# Patient Record
Sex: MALE | Race: WHITE | Employment: FULL TIME | URBAN - METROPOLITAN AREA
[De-identification: names, ages, dates, MRNs, and addresses within clinical notes are randomized per-mention and may not be internally consistent; named-entity substitution may affect disease eponyms.]

---

## 2017-01-30 DIAGNOSIS — E11.65 TYPE 2 DIABETES MELLITUS WITH HYPERGLYCEMIA, WITHOUT LONG-TERM CURRENT USE OF INSULIN (H): ICD-10-CM

## 2017-01-30 LAB — HBA1C MFR BLD: 8.4 % (ref 4.3–6)

## 2017-01-30 PROCEDURE — 36415 COLL VENOUS BLD VENIPUNCTURE: CPT | Performed by: FAMILY MEDICINE

## 2017-01-30 PROCEDURE — 83036 HEMOGLOBIN GLYCOSYLATED A1C: CPT | Performed by: FAMILY MEDICINE

## 2017-01-30 NOTE — PROGRESS NOTES
Quick Note:    Mat,  Unfortunately, your glycosylated hemoglobin is even higher than last time. I see you are coming in for an appointment on Thursday, and we will discuss at that time strategies to get this down into the acceptable range      Eliel Barragan MD    ______

## 2017-02-02 ENCOUNTER — OFFICE VISIT (OUTPATIENT)
Dept: FAMILY MEDICINE | Facility: CLINIC | Age: 57
End: 2017-02-02
Payer: COMMERCIAL

## 2017-02-02 VITALS
DIASTOLIC BLOOD PRESSURE: 86 MMHG | WEIGHT: 273.7 LBS | HEIGHT: 76 IN | HEART RATE: 59 BPM | SYSTOLIC BLOOD PRESSURE: 136 MMHG | BODY MASS INDEX: 33.33 KG/M2

## 2017-02-02 DIAGNOSIS — I10 ESSENTIAL HYPERTENSION WITH GOAL BLOOD PRESSURE LESS THAN 140/90: ICD-10-CM

## 2017-02-02 DIAGNOSIS — G47.33 OSA (OBSTRUCTIVE SLEEP APNEA): ICD-10-CM

## 2017-02-02 DIAGNOSIS — E11.65 TYPE 2 DIABETES MELLITUS WITH HYPERGLYCEMIA, WITHOUT LONG-TERM CURRENT USE OF INSULIN (H): Primary | ICD-10-CM

## 2017-02-02 DIAGNOSIS — E78.5 HYPERLIPIDEMIA LDL GOAL <100: ICD-10-CM

## 2017-02-02 DIAGNOSIS — E66.09 NON MORBID OBESITY DUE TO EXCESS CALORIES: ICD-10-CM

## 2017-02-02 PROCEDURE — 99214 OFFICE O/P EST MOD 30 MIN: CPT | Performed by: FAMILY MEDICINE

## 2017-02-02 PROCEDURE — 99207 ZZC FOOT EXAM  NO CHARGE: CPT | Performed by: FAMILY MEDICINE

## 2017-02-02 RX ORDER — GLIMEPIRIDE 4 MG/1
4 TABLET ORAL
Qty: 30 TABLET | Refills: 0 | Status: SHIPPED | OUTPATIENT
Start: 2017-02-02 | End: 2017-02-24

## 2017-02-02 RX ORDER — METFORMIN HCL 500 MG
TABLET, EXTENDED RELEASE 24 HR ORAL
Qty: 360 TABLET | Refills: 1 | Status: SHIPPED | OUTPATIENT
Start: 2017-02-02 | End: 2017-06-26

## 2017-02-02 NOTE — MR AVS SNAPSHOT
After Visit Summary   2/2/2017    Mat Palmer    MRN: [id number]           Patient Information     Date Of Birth          1960        Visit Information        Provider Department      2/2/2017 3:00 PM MICHELLE Barragan MD [facility]        Today's Diagnoses     Type 2 diabetes mellitus with hyperglycemia, without long-term current use of insulin (H)    -  1       Care Instructions    Diabetes Plan  1. Hemoglobin A1c goal is between 7% and 8%  Your Hemoglobin A1c is not at goal  Plan is:Add glimepiride 4 mg every am. Do not take if you are unable to eat for any reason  2. LDL (bad cholesterol) goal is less than 100  Your LDL is at goal  Plan is: Continue medications at current doses  3. Blood pressure goal is less than 140/90.  Your blood pressure is at goal.  Plan is: Continue medications at current doses    Come back for glycosylated hemoglobin in 3 months. If Ok will refill for 3 more months and office visit then        Follow-ups after your visit        Who to contact     If you have questions or need follow up information about today's clinic visit or your schedule please contact [facility] directly at [phone].  Normal or non-critical lab and imaging results will be communicated to you by [employer], letter or phone within 4 business days after the clinic has received the results. If you do not hear from us within 7 days, please contact the clinic through [employer] or phone. If you have a critical or abnormal lab result, we will notify you by phone as soon as possible.  Submit refill requests through [employer] or call your pharmacy and they will forward the refill request to us. Please allow 3 business days for your refill to be completed.          Additional Information About Your Visit        MyChart Information     [employer] gives you secure access to your electronic health record. If you see a primary care provider, you can also send messages to your care  "team and make appointments. If you have questions, please call your primary care clinic.  If you do not have a primary care provider, please call [phone] and they will assist you.        Care EveryWhere ID     This is your Care EveryWhere ID. This could be used by other organizations to access your [place] medical records  [id number]        Your Vitals Were     Pulse Height BMI (Body Mass Index)             59 6' 3.5\" (1.918 m) 33.75 kg/m2          Blood Pressure from Last 3 Encounters:   02/02/17 136/86   10/06/16 128/80   06/14/16 120/78    Weight from Last 3 Encounters:   02/02/17 273 lb 11.2 oz (124.15 kg)   10/06/16 271 lb 3.2 oz (123.016 kg)   06/14/16 271 lb 9.6 oz (123.197 kg)              Today, you had the following     No orders found for display         Today's Medication Changes          These changes are accurate as of: 2/2/17  3:38 PM.  If you have any questions, ask your nurse or doctor.               Start taking these medicines.        Dose/Directions    glimepiride 4 MG tablet   Commonly known as:  AMARYL   Used for:  Type 2 diabetes mellitus with hyperglycemia, without long-term current use of insulin (H)   Started by:  MICHELLE Barragan MD        Dose:  4 mg   Take 1 tablet (4 mg) by mouth every morning (before breakfast)   Quantity:  30 tablet   Refills:  0         These medicines have changed or have updated prescriptions.        Dose/Directions    metFORMIN 500 MG 24 hr tablet   Commonly known as:  GLUCOPHAGE-XR   This may have changed:  additional instructions   Used for:  Type 2 diabetes mellitus with hyperglycemia, without long-term current use of insulin (H)   Changed by:  MICHELLE Barragan MD        2 tabs twice daily   Quantity:  360 tablet   Refills:  1            Where to get your medicines      These medications were sent to [facility] - [place], [place] - [facility] [facility]  [address] [address]     Phone:  [phone]    - metFORMIN 500 MG " 24 hr tablet      These medications were sent to [facility] - [place], [place] - [address]  [address] [address]     Phone:  [phone]    - glimepiride 4 MG tablet             Primary Care Provider Office Phone # Fax #    R Eliel Barragan [phone]       [facility] [address] [facility] [address]        Thank you!     Thank you for choosing [facility]  for your care. Our goal is always to provide you with excellent care. Hearing back from our patients is one way we can continue to improve our services. Please take a few minutes to complete the written survey that you may receive in the mail after your visit with us. Thank you!             Your Updated Medication List - Protect others around you: Learn how to safely use, store and throw away your medicines at www.disposemymeds.org.          This list is accurate as of: 2/2/17  3:38 PM.  Always use your most recent med list.                   Brand Name Dispense Instructions for use    acyclovir 800 MG tablet    ZOVIRAX    35 tablet    Take 1 tablet (800 mg) by mouth 5 times daily       amoxicillin-clavulanate 875-125 MG per tablet    AUGMENTIN    20 tablet    Take 1 tablet by mouth 2 times daily       aspirin 81 MG tablet     100 tablet    Take 1 tablet by mouth daily.       blood glucose monitoring lancets     102 each    1 each daily.       BLOOD GLUCOSE TEST STRIPS Strp     100 each    Test once daily and as needed - Fill with One Touch Ultra Blue Test Strips - Per Patient's Insurance       glimepiride 4 MG tablet    AMARYL    30 tablet    Take 1 tablet (4 mg) by mouth every morning (before breakfast)       lisinopril 40 MG tablet    PRINIVIL/ZESTRIL    90 tablet    Take 1 tablet (40 mg) by mouth daily       metFORMIN 500 MG 24 hr tablet    GLUCOPHAGE-XR    360 tablet    2 tabs twice daily       omeprazole 20 MG tablet    priLOSEC OTC    90  tablet    Take 1 tablet (20 mg) by mouth daily       order for DME     1 Units    C pap machine with tubing and nasal pillows       order for DME     1 Device    Equipment being ordered: Please dispense CPAP supplies: nasal pillows, tubing, and head gear. Telephone: [phone] Fax:[phone]       simvastatin 40 MG tablet    ZOCOR    90 tablet    Take 1 tablet (40 mg) by mouth At Bedtime

## 2017-02-02 NOTE — NURSING NOTE
"Chief Complaint   Patient presents with     Diabetes       Initial /86 mmHg  Pulse 59  Ht 6' 3.5\" (1.918 m)  Wt 273 lb 11.2 oz (124.15 kg)  BMI 33.75 kg/m2 Estimated body mass index is 33.75 kg/(m^2) as calculated from the following:    Height as of this encounter: 6' 3.5\" (1.918 m).    Weight as of this encounter: 273 lb 11.2 oz (124.15 kg).  BP completed using cuff size: large  Peyton Card CMA      "

## 2017-02-02 NOTE — PATIENT INSTRUCTIONS
Diabetes Plan  1. Hemoglobin A1c goal is between 7% and 8%  Your Hemoglobin A1c is not at goal  Plan is:Add glimepiride 4 mg every am. Do not take if you are unable to eat for any reason  2. LDL (bad cholesterol) goal is less than 100  Your LDL is at goal  Plan is: Continue medications at current doses  3. Blood pressure goal is less than 140/90.  Your blood pressure is at goal.  Plan is: Continue medications at current doses    Come back for glycosylated hemoglobin in 3 months. If Ok will refill for 3 more months and office visit then

## 2017-02-02 NOTE — PROGRESS NOTES
"  SUBJECTIVE:                                                    Mat Palmer is a 56 year old male who presents to clinic today for the following health issues:      Chief Complaint   Patient presents with     Diabetes     His recent glycosylated hemoglobin was higher than last time. A1C      8.4   1/30/2017. He states he was not surprised because many of his blood sugars are in the 200+ range. He has continued to work hard at eating a healthy diet and staying physically active, and thus was frustrated that his blood sugars are still high          Hyperlipidemia Follow-Up      Rate your low fat/cholesterol diet?: good    Taking statin?  Yes, no muscle aches from statin    Other lipid medications/supplements?:  none     Hypertension Follow-up      Outpatient blood pressures are being checked at home.  Results are in the acceptable range.    Low Salt Diet: no added salt       Problem list and histories reviewed & adjusted, as indicated.  Additional history: none              ROS:  Constitutional, HEENT, cardiovascular, pulmonary, gi and gu systems are negative, except as otherwise noted.        OBJECTIVE:                                                    /86 mmHg  Pulse 59  Ht 6' 3.5\" (1.918 m)  Wt 273 lb 11.2 oz (124.15 kg)  BMI 33.75 kg/m2    GENERAL: healthy, alert and no distress  EYES: Eyes grossly normal to inspection, extraocular movements - intact, and PERRL  NECK: no tenderness, no adenopathy, no asymmetry, no masses, no stiffness; thyroid- normal to palpation  RESP: lungs clear to auscultation - no rales, no rhonchi, no wheezes  CV: regular rates and rhythm, normal S1 S2, no S3 or S4 and no murmur, no click or rub -  MS: extremities- no gross deformities noted, no edema  Diabetic foot exam: no trophic changes or ulcerative lesions and normal monofilament exam      Diagnostic Test Results:  Results for orders placed or performed in visit on 01/30/17   Hemoglobin A1c   Result Value Ref Range    " Hemoglobin A1C 8.4 (H) 4.3 - 6.0 %        ASSESSMENT/PLAN:                                                    ASSESSMENT:  1. Type 2 diabetes mellitus with hyperglycemia, without long-term current use of insulin (H)    2. Morbid obesity, unspecified obesity type (H)    3. Essential hypertension with goal blood pressure less than 140/90    4. Hyperlipidemia LDL goal <100    5. IVANIA (obstructive sleep apnea)        PLAN:  He agreed with my recommendation to add a second diabetes medication to try and improve his glycemic control. He is very motivated to get this into the acceptable range      Orders Placed This Encounter     FOOT EXAM     Hemoglobin A1c     metFORMIN (GLUCOPHAGE-XR) 500 MG 24 hr tablet     glimepiride (AMARYL) 4 MG tablet   Discussed how to take the medication(s), expected outcomes, potential side effects.     Patient Instructions   Diabetes Plan  1. Hemoglobin A1c goal is between 7% and 8%  Your Hemoglobin A1c is not at goal  Plan is:Add glimepiride 4 mg every am. Do not take if you are unable to eat for any reason  2. LDL (bad cholesterol) goal is less than 100  Your LDL is at goal  Plan is: Continue medications at current doses  3. Blood pressure goal is less than 140/90.  Your blood pressure is at goal.  Plan is: Continue medications at current doses    Come back for glycosylated hemoglobin in 3 months. If Ok will refill for 3 more months and office visit then      MICHELLE Barragan  [facility]

## 2017-02-24 ENCOUNTER — TELEPHONE (OUTPATIENT)
Dept: FAMILY MEDICINE | Facility: CLINIC | Age: 57
End: 2017-02-24

## 2017-02-24 DIAGNOSIS — E11.65 TYPE 2 DIABETES MELLITUS WITH HYPERGLYCEMIA, WITHOUT LONG-TERM CURRENT USE OF INSULIN (H): ICD-10-CM

## 2017-02-24 RX ORDER — GLIMEPIRIDE 4 MG/1
4 TABLET ORAL
Qty: 90 TABLET | Refills: 0 | Status: SHIPPED | OUTPATIENT
Start: 2017-02-24 | End: 2017-05-08

## 2017-02-24 NOTE — TELEPHONE ENCOUNTER
Glimepiride          Last Written Prescription Date: 2/2/2017  Last Fill Quantity: 30, # refills: 0  Last Office Visit with [facility], [facility] or [facility] prescribing provider:  2/2/2017        BP Readings from Last 3 Encounters:   02/02/17 136/86   10/06/16 128/80   06/14/16 120/78     Lab Results   Component Value Date    MICROL 24 11/27/2015     No results found for: MICROALBUMIN  Creatinine   Date Value Ref Range Status   06/14/2016 0.80 0.66 - 1.25 mg/dL Final   ]  GFR Estimate   Date Value Ref Range Status   06/14/2016 >90  Non  GFR Calc   >60 mL/min/1.7m2 Final   11/27/2015 >90  Non  GFR Calc   >60 mL/min/1.7m2 Final   03/26/2014 79 >60 mL/min/1.7m2 Final     GFR Estimate If Black   Date Value Ref Range Status   06/14/2016 >90   GFR Calc   >60 mL/min/1.7m2 Final   11/27/2015 >90   GFR Calc   >60 mL/min/1.7m2 Final   03/26/2014 >90 >60 mL/min/1.7m2 Final     Lab Results   Component Value Date    CHOL 130 05/18/2016     Lab Results   Component Value Date    HDL 45 05/18/2016     Lab Results   Component Value Date    LDL 53 05/18/2016    LDL 59 08/19/2013     Lab Results   Component Value Date    TRIG 161 05/18/2016     Lab Results   Component Value Date    CHOLHDLRATIO 3.5 12/24/2014     Lab Results   Component Value Date    AST 24 08/19/2013     Lab Results   Component Value Date    ALT 42 11/27/2015     Lab Results   Component Value Date    A1C 8.4 01/30/2017    A1C 8.0 09/19/2016    A1C 8.6 05/18/2016    A1C 7.5 11/27/2015    A1C 7.0 12/24/2014     Potassium   Date Value Ref Range Status   06/14/2016 4.1 3.4 - 5.3 mmol/L Final

## 2017-03-03 ENCOUNTER — MYC MEDICAL ADVICE (OUTPATIENT)
Dept: FAMILY MEDICINE | Facility: CLINIC | Age: 57
End: 2017-03-03

## 2017-03-08 NOTE — TELEPHONE ENCOUNTER
Tried to reach by phone, left message on identified voice mail that I will try calling again tomorrow or later in the week, or I may have time to type some detailed questions in a My Chart response.  I am concerned about the severity of this diet as he is proposing to lose >10% of his body weight in just one month, and I am interested in what the maintenance plan is. So I will try to reach him later this week to address these issues.    Sadie Polo md    3/8/17  2:35 PM  -- tried calling cell phone again, message left   Tried calling home phone -- voice mail unidentified, no message left.    NLA

## 2017-03-09 NOTE — TELEPHONE ENCOUNTER
Message routed to Dr. Barragan, who knows the patient, and can best advise him on this.  Perhaps a visit with Scarlett Carpenter, who can speak from the perspectives of both a diabetic educator and a nutritionist, would be of help.    Sadie Polo md

## 2017-05-05 DIAGNOSIS — E78.5 HYPERLIPIDEMIA LDL GOAL <100: ICD-10-CM

## 2017-05-05 DIAGNOSIS — I10 ESSENTIAL HYPERTENSION WITH GOAL BLOOD PRESSURE LESS THAN 140/90: ICD-10-CM

## 2017-05-08 DIAGNOSIS — E11.65 TYPE 2 DIABETES MELLITUS WITH HYPERGLYCEMIA, WITHOUT LONG-TERM CURRENT USE OF INSULIN (H): ICD-10-CM

## 2017-05-08 RX ORDER — LISINOPRIL 40 MG/1
TABLET ORAL
Qty: 90 TABLET | Refills: 0 | Status: SHIPPED | OUTPATIENT
Start: 2017-05-08 | End: 2017-06-26

## 2017-05-08 RX ORDER — SIMVASTATIN 40 MG
TABLET ORAL
Qty: 90 TABLET | Refills: 0 | Status: SHIPPED | OUTPATIENT
Start: 2017-05-08 | End: 2017-06-26

## 2017-05-08 NOTE — TELEPHONE ENCOUNTER
Lisinopril      Last Written Prescription Date: 11/08/16  Last Fill Quantity: 90, # refills: 1  Last Office Visit with [facility], [facility] or [facility] prescribing provider: 02/02/17       Potassium   Date Value Ref Range Status   06/14/2016 4.1 3.4 - 5.3 mmol/L Final     Creatinine   Date Value Ref Range Status   06/14/2016 0.80 0.66 - 1.25 mg/dL Final     BP Readings from Last 3 Encounters:   02/02/17 136/86   10/06/16 128/80   06/14/16 120/78     Simvastatin       Last Written Prescription Date: 11/08/16  Last Fill Quantity: 90, # refills: 1    Last Office Visit with [facility], [facility] or [facility] prescribing provider:  02/02/17   Future Office Visit:      Cholesterol   Date Value Ref Range Status   05/18/2016 130 <200 mg/dL Final     HDL Cholesterol   Date Value Ref Range Status   05/18/2016 45 >39 mg/dL Final     LDL Cholesterol Calculated   Date Value Ref Range Status   05/18/2016 53 <100 mg/dL Final     Comment:     Desirable:       <100 mg/dl     Triglycerides   Date Value Ref Range Status   05/18/2016 161 (H) <150 mg/dL Final     Comment:     Borderline high:  150-199 mg/dl   High:             200-499 mg/dl   Very high:       >499 mg/dl       Cholesterol/HDL Ratio   Date Value Ref Range Status   12/24/2014 3.5 0.0 - 5.0 Final     ALT   Date Value Ref Range Status   11/27/2015 42 0 - 70 U/L Final

## 2017-05-08 NOTE — TELEPHONE ENCOUNTER
Glimepiride        Last Written Prescription Date: 02/24/17  Last Fill Quantity: 90, # refills: 0  Last Office Visit with [facility], [facility] or [facility] prescribing provider:  02/02/17        BP Readings from Last 3 Encounters:   02/02/17 136/86   10/06/16 128/80   06/14/16 120/78     Lab Results   Component Value Date    MICROL 24 11/27/2015     Lab Results   Component Value Date    UMALCR 19.92 11/27/2015     Creatinine   Date Value Ref Range Status   06/14/2016 0.80 0.66 - 1.25 mg/dL Final   ]  GFR Estimate   Date Value Ref Range Status   06/14/2016 >90  Non  GFR Calc   >60 mL/min/1.7m2 Final   11/27/2015 >90  Non  GFR Calc   >60 mL/min/1.7m2 Final   03/26/2014 79 >60 mL/min/1.7m2 Final     GFR Estimate If Black   Date Value Ref Range Status   06/14/2016 >90   GFR Calc   >60 mL/min/1.7m2 Final   11/27/2015 >90   GFR Calc   >60 mL/min/1.7m2 Final   03/26/2014 >90 >60 mL/min/1.7m2 Final     Lab Results   Component Value Date    CHOL 130 05/18/2016     Lab Results   Component Value Date    HDL 45 05/18/2016     Lab Results   Component Value Date    LDL 53 05/18/2016     Lab Results   Component Value Date    TRIG 161 05/18/2016     Lab Results   Component Value Date    CHOLHDLRATIO 3.5 12/24/2014     Lab Results   Component Value Date    AST 24 08/19/2013     Lab Results   Component Value Date    ALT 42 11/27/2015     Lab Results   Component Value Date    A1C 8.4 01/30/2017    A1C 8.0 09/19/2016    A1C 8.6 05/18/2016    A1C 7.5 11/27/2015    A1C 7.0 12/24/2014     Potassium   Date Value Ref Range Status   06/14/2016 4.1 3.4 - 5.3 mmol/L Final

## 2017-05-09 RX ORDER — GLIMEPIRIDE 4 MG/1
4 TABLET ORAL
Qty: 30 TABLET | Refills: 0 | Status: SHIPPED | OUTPATIENT
Start: 2017-05-09 | End: 2017-06-26

## 2017-05-19 DIAGNOSIS — B00.9 HERPES SIMPLEX VIRUS INFECTION: ICD-10-CM

## 2017-05-22 NOTE — TELEPHONE ENCOUNTER
Acyclovir     Last Written Prescription Date: 03/24/16  Last Fill Quantity: 35, # refills: 4  Last Office Visit with [facility] or [facility] prescribing provider: 02/02/17        Creatinine   Date Value Ref Range Status   06/14/2016 0.80 0.66 - 1.25 mg/dL Final

## 2017-05-24 RX ORDER — ACYCLOVIR 800 MG/1
TABLET ORAL
Qty: 35 TABLET | Refills: 0 | Status: SHIPPED | OUTPATIENT
Start: 2017-05-24 | End: 2017-11-18

## 2017-05-24 NOTE — TELEPHONE ENCOUNTER
Prescription approved per [facility] Refill Protocol.    Pham VILLAGRAN RN

## 2017-06-15 ENCOUNTER — MYC MEDICAL ADVICE (OUTPATIENT)
Dept: FAMILY MEDICINE | Facility: CLINIC | Age: 57
End: 2017-06-15

## 2017-06-23 DIAGNOSIS — E78.5 HYPERLIPIDEMIA LDL GOAL <100: ICD-10-CM

## 2017-06-23 DIAGNOSIS — I10 ESSENTIAL HYPERTENSION WITH GOAL BLOOD PRESSURE LESS THAN 140/90: ICD-10-CM

## 2017-06-23 DIAGNOSIS — E11.65 TYPE 2 DIABETES MELLITUS WITH HYPERGLYCEMIA, WITHOUT LONG-TERM CURRENT USE OF INSULIN (H): ICD-10-CM

## 2017-06-23 LAB
ANION GAP SERPL CALCULATED.3IONS-SCNC: 8 MMOL/L (ref 3–14)
BUN SERPL-MCNC: 16 MG/DL (ref 7–30)
CALCIUM SERPL-MCNC: 9.1 MG/DL (ref 8.5–10.1)
CHLORIDE SERPL-SCNC: 105 MMOL/L (ref 94–109)
CHOLEST SERPL-MCNC: 150 MG/DL
CO2 SERPL-SCNC: 25 MMOL/L (ref 20–32)
CREAT SERPL-MCNC: 0.95 MG/DL (ref 0.66–1.25)
GFR SERPL CREATININE-BSD FRML MDRD: 82 ML/MIN/1.7M2
GLUCOSE SERPL-MCNC: 173 MG/DL (ref 70–99)
HBA1C MFR BLD: 6.4 % (ref 4.3–6)
HDLC SERPL-MCNC: 62 MG/DL
LDLC SERPL CALC-MCNC: 78 MG/DL
NONHDLC SERPL-MCNC: 88 MG/DL
POTASSIUM SERPL-SCNC: 4.7 MMOL/L (ref 3.4–5.3)
SODIUM SERPL-SCNC: 138 MMOL/L (ref 133–144)
TRIGL SERPL-MCNC: 52 MG/DL

## 2017-06-23 PROCEDURE — 80048 BASIC METABOLIC PNL TOTAL CA: CPT | Performed by: FAMILY MEDICINE

## 2017-06-23 PROCEDURE — 80061 LIPID PANEL: CPT | Performed by: FAMILY MEDICINE

## 2017-06-23 PROCEDURE — 83036 HEMOGLOBIN GLYCOSYLATED A1C: CPT | Performed by: FAMILY MEDICINE

## 2017-06-23 PROCEDURE — 36415 COLL VENOUS BLD VENIPUNCTURE: CPT | Performed by: FAMILY MEDICINE

## 2017-06-23 NOTE — PROGRESS NOTES
Mat,  Your glycosylated hemoglobin is much improved, now in the goal range that we are shooting for. Her metabolic panel and cholesterol levels were fine also. We will go over this in more detail at your appointment on Monday      Eliel Barragan MD

## 2017-06-26 ENCOUNTER — OFFICE VISIT (OUTPATIENT)
Dept: FAMILY MEDICINE | Facility: CLINIC | Age: 57
End: 2017-06-26
Payer: COMMERCIAL

## 2017-06-26 VITALS
SYSTOLIC BLOOD PRESSURE: 138 MMHG | HEART RATE: 72 BPM | WEIGHT: 273.4 LBS | DIASTOLIC BLOOD PRESSURE: 86 MMHG | BODY MASS INDEX: 33.29 KG/M2 | HEIGHT: 76 IN

## 2017-06-26 DIAGNOSIS — E78.5 HYPERLIPIDEMIA LDL GOAL <100: ICD-10-CM

## 2017-06-26 DIAGNOSIS — I10 ESSENTIAL HYPERTENSION WITH GOAL BLOOD PRESSURE LESS THAN 140/90: ICD-10-CM

## 2017-06-26 DIAGNOSIS — E11.65 TYPE 2 DIABETES MELLITUS WITH HYPERGLYCEMIA, WITHOUT LONG-TERM CURRENT USE OF INSULIN (H): ICD-10-CM

## 2017-06-26 DIAGNOSIS — E66.09 NON MORBID OBESITY DUE TO EXCESS CALORIES: Primary | ICD-10-CM

## 2017-06-26 PROCEDURE — 99214 OFFICE O/P EST MOD 30 MIN: CPT | Performed by: FAMILY MEDICINE

## 2017-06-26 RX ORDER — METFORMIN HCL 500 MG
TABLET, EXTENDED RELEASE 24 HR ORAL
Qty: 360 TABLET | Refills: 1
Start: 2017-06-26 | End: 2017-11-28

## 2017-06-26 RX ORDER — GLIMEPIRIDE 4 MG/1
4 TABLET ORAL
Qty: 90 TABLET | Refills: 1 | Status: SHIPPED | OUTPATIENT
Start: 2017-06-26 | End: 2017-09-08

## 2017-06-26 RX ORDER — SIMVASTATIN 40 MG
40 TABLET ORAL AT BEDTIME
Qty: 90 TABLET | Refills: 1 | Status: SHIPPED | OUTPATIENT
Start: 2017-06-26 | End: 2017-11-28

## 2017-06-26 RX ORDER — LISINOPRIL 40 MG/1
40 TABLET ORAL DAILY
Qty: 90 TABLET | Refills: 1 | Status: SHIPPED | OUTPATIENT
Start: 2017-06-26 | End: 2017-11-28

## 2017-06-26 NOTE — PROGRESS NOTES
"  SUBJECTIVE:                                                    Mat Palmer is a 56 year old male who presents to clinic today for the following health issues:      Diabetes Follow-up      Patient is checking blood sugars: 2 times a week    Diabetic concerns: None     Symptoms of hypoglycemia (low blood sugar): none     Paresthesias (numbness or burning in feet) or sores: No     Date of last diabetic eye exam: 6 months ago      Amount of exercise or physical activity: 2-3 days/week for an average of 45-60 minutes    Problems taking medications regularly: No    Medication side effects: none    Diet: low salt, low fat/cholesterol and carbohydrate counting    He has noticed a definite improvement in his blood sugars. He has improved his diet and lost some weight. Recently he gained back some the weight he had lost previously and is getting serious about trying to lose more weight. He cut the dose of his metformin back from 1000 twice a day to 500 twice a day and his blood sugars have stayed in the acceptable range    Hyperlipidemia Follow-Up      Rate your low fat/cholesterol diet?: good    Taking statin?  Yes, no muscle aches from statin    Other lipid medications/supplements?:  none    Hypertension Follow-up      Outpatient blood pressures are being checked at home.  Results are in the acceptable range.    Low Salt Diet: no added salt      Problem list and histories reviewed & adjusted, as indicated.  Additional history: none        Reviewed and updated as needed this visit by clinical staff  Tobacco  Allergies  Med Hx  Surg Hx  Fam Hx  Soc Hx      Reviewed and updated as needed this visit by Provider               ROS:  Constitutional, HEENT, cardiovascular, pulmonary, gi and gu systems are negative, except as otherwise noted.        OBJECTIVE:                                                    /86 (BP Location: Right arm, Patient Position: Chair, Cuff Size: Adult Large)  Pulse 72  Ht 6' 3.5\" " (1.918 m)  Wt 273 lb 6.4 oz (124 kg)  BMI 33.72 kg/m2    GENERAL: healthy, alert and no distress  EYES: Eyes grossly normal to inspection, extraocular movements - intact, and PERRL  NECK: no tenderness, no adenopathy, no asymmetry, no masses, no stiffness; thyroid- normal to palpation  RESP: lungs clear to auscultation - no rales, no rhonchi, no wheezes  CV: regular rates and rhythm, normal S1 S2, no S3 or S4 and no murmur, no click or rub -  MS: extremities- no gross deformities noted, no edema    Diagnostic test results:  Results for orders placed or performed in visit on 06/23/17   Hemoglobin A1c   Result Value Ref Range    Hemoglobin A1C 6.4 (H) 4.3 - 6.0 %   Lipid Profile with reflex to direct LDL   Result Value Ref Range    Cholesterol 150 <200 mg/dL    Triglycerides 52 <150 mg/dL    HDL Cholesterol 62 >39 mg/dL    LDL Cholesterol Calculated 78 <100 mg/dL    Non HDL Cholesterol 88 <130 mg/dL   Basic metabolic panel  (Ca, Cl, CO2, Creat, Gluc, K, Na, BUN)   Result Value Ref Range    Sodium 138 133 - 144 mmol/L    Potassium 4.7 3.4 - 5.3 mmol/L    Chloride 105 94 - 109 mmol/L    Carbon Dioxide 25 20 - 32 mmol/L    Anion Gap 8 3 - 14 mmol/L    Glucose 173 (H) 70 - 99 mg/dL    Urea Nitrogen 16 7 - 30 mg/dL    Creatinine 0.95 0.66 - 1.25 mg/dL    GFR Estimate 82 >60 mL/min/1.7m2    GFR Estimate If Black >90   GFR Calc   >60 mL/min/1.7m2    Calcium 9.1 8.5 - 10.1 mg/dL        ASSESSMENT/PLAN:                                                    ASSESSMENT:  1. Non morbid obesity due to excess calories    2. Type 2 diabetes mellitus with hyperglycemia, without long-term current use of insulin (H)    3. Hyperlipidemia LDL goal <100    4. Essential hypertension with goal blood pressure less than 140/90        PLAN:  Orders Placed This Encounter     metFORMIN (GLUCOPHAGE-XR) 500 MG 24 hr tablet     simvastatin (ZOCOR) 40 MG tablet     lisinopril (PRINIVIL/ZESTRIL) 40 MG tablet     glimepiride (AMARYL) 4  MG tablet       Keep up the great work! Continue meds the same and recheck in 6 months    MICHELLE Julian [facility]

## 2017-06-26 NOTE — PATIENT INSTRUCTIONS
Thank you for choosing [facility]  You may be receiving a survey in the mail from [facility] regarding your visit today.  Please take a few minutes to complete and return the survey to let us know how we are doing.  Our Clinic hours are:  Mondays    7:20 am - 7 pm  Tues -  Fri  7:20 am - 5 pm  Clinic Phone: [phone]  The clinic lab opens at 7:30 am Mon - Fri and appointments are required.  [facility]. [phone]  Monday-Thursday 8 am - 7pm  Tues/Wed/Fri 8 am - 5:30 pm

## 2017-06-26 NOTE — MR AVS SNAPSHOT
After Visit Summary   6/26/2017    Mat Palmer    MRN: [id number]           Patient Information     Date Of Birth          1960        Visit Information        Provider Department      6/26/2017 9:00 AM MICHELLE Barragan MD [facility]        Today's Diagnoses     Type 2 diabetes mellitus with hyperglycemia, without long-term current use of insulin (H)        Hyperlipidemia LDL goal <100        Essential hypertension with goal blood pressure less than 140/90          Care Instructions    Thank you for choosing [facility].  You may be receiving a survey in the mail from [employer] regarding your visit today.  Please take a few minutes to complete and return the survey to let us know how we are doing.  Our Clinic hours are:  Mondays    7:20 am - 7 pm  Tues -  Fri  7:20 am - 5 pm  Clinic Phone: [phone]  The clinic lab opens at 7:30 am Mon - Fri and appointments are required.  [facility]  Ph. [phone]  Monday-Thursday 8 am - 7pm  Tues/Wed/Fri 8 am - 5:30 pm            Follow-ups after your visit        Who to contact     If you have questions or need follow up information about today's clinic visit or your schedule please contact [facility] [place]note 2[facility] directly at [phone].  Normal or non-critical lab and imaging results will be communicated to you by MyChart, letter or phone within 4 business days after the clinic has received the results. If you do not hear from us within 7 days, please contact the clinic through Chatterflyhart or phone. If you have a critical or abnormal lab result, we will notify you by phone as soon as possible.  Submit refill requests through [employer] or call your pharmacy and they will forward the refill request to us. Please allow 3 business days for your refill to be completed.          Additional Information About Your Visit        ChatterflyharBrightfish Information     [employer] gives you secure access to your electronic health  "record. If you see a primary care provider, you can also send messages to your care team and make appointments. If you have questions, please call your primary care clinic.  If you do not have a primary care provider, please call [phone] and they will assist you.        Care EveryWhere ID     This is your Care EveryWhere ID. This could be used by other organizations to access your [facility] medical records  [id number]        Your Vitals Were     Pulse Height BMI (Body Mass Index)             72 6' 3.5\" (1.918 m) 33.72 kg/m2          Blood Pressure from Last 3 Encounters:   06/26/17 138/86   02/02/17 136/86   10/06/16 128/80    Weight from Last 3 Encounters:   06/26/17 273 lb 6.4 oz (124 kg)   02/02/17 273 lb 11.2 oz (124.1 kg)   10/06/16 271 lb 3.2 oz (123 kg)              Today, you had the following     No orders found for display         Today's Medication Changes          These changes are accurate as of: 6/26/17  9:29 AM.  If you have any questions, ask your nurse or doctor.               These medicines have changed or have updated prescriptions.        Dose/Directions    glimepiride 4 MG tablet   Commonly known as:  AMARYL   This may have changed:  additional instructions   Used for:  Type 2 diabetes mellitus with hyperglycemia, without long-term current use of insulin (H)   Changed by:  MICHELLE Barragan MD        Dose:  4 mg   Take 1 tablet (4 mg) by mouth every morning (before breakfast)   Quantity:  90 tablet   Refills:  1       lisinopril 40 MG tablet   Commonly known as:  PRINIVIL/ZESTRIL   This may have changed:  See the new instructions.   Used for:  Essential hypertension with goal blood pressure less than 140/90   Changed by:  MICHELLE Barragan MD        Dose:  40 mg   Take 1 tablet (40 mg) by mouth daily   Quantity:  90 tablet   Refills:  1       metFORMIN 500 MG 24 hr tablet   Commonly known as:  GLUCOPHAGE-XR   This may have changed:  additional instructions   Used for:  Type 2 diabetes mellitus " with hyperglycemia, without long-term current use of insulin (H)   Changed by:  MICHELLE Barragan MD        1 tab twice daily   Quantity:  360 tablet   Refills:  1       simvastatin 40 MG tablet   Commonly known as:  ZOCOR   This may have changed:  See the new instructions.   Used for:  Hyperlipidemia LDL goal <100   Changed by:  MICHELLE Barragan MD        Dose:  40 mg   Take 1 tablet (40 mg) by mouth At Bedtime   Quantity:  90 tablet   Refills:  1            Where to get your medicines      These medications were sent to [facility] - [address] [address]     Phone:  [phone]     glimepiride 4 MG tablet    lisinopril 40 MG tablet    simvastatin 40 MG tablet         Some of these will need a paper prescription and others can be bought over the counter.  Ask your nurse if you have questions.     You don't need a prescription for these medications     metFORMIN 500 MG 24 hr tablet                Primary Care Provider Office Phone # Fax #    MICHELLE Barragan [phone]       [place] [address]        Equal Access to Services     [facility] : Hadii neeta ku hadasho Soomaali, waaxda luqadaha, qaybta kaalmada adeduane, calros estrada. So [facility] [phone].    ATENCIÓN: Si habla español, tiene a soto disposición servicios gratuitos de asistencia lingüística. Leeanne al [phone].    We comply with applicable federal civil rights laws and [place] laws. We do not discriminate on the basis of race, color, national origin, age, disability sex, sexual orientation or gender identity.            Thank you!     Thank you for choosing [facility]  for your care. Our goal is always to provide you with excellent care. Hearing back from our patients is one way we can continue to improve our services. Please take a few minutes to complete the written  survey that you may receive in the mail after your visit with us. Thank you!             Your Updated Medication List - Protect others around you: Learn how to safely use, store and throw away your medicines at www.disposemymeds.org.          This list is accurate as of: 6/26/17  9:29 AM.  Always use your most recent med list.                   Brand Name Dispense Instructions for use Diagnosis    acyclovir 800 MG tablet    ZOVIRAX    35 tablet    TAKE ONE TABLET BY MOUTH FIVE TIMES A DAY    Herpes simplex virus infection       aspirin 81 MG tablet     100 tablet    Take 1 tablet by mouth daily.    Type 2 diabetes, HbA1c goal < 7% (H)       blood glucose monitoring lancets     102 each    1 each daily.    Type 2 diabetes, HbA1c goal < 7% (H)       BLOOD GLUCOSE TEST STRIPS Strp     100 each    Test once daily and as needed - Fill with One Touch Ultra Blue Test Strips - Per Patient's Insurance    Type 2 diabetes mellitus with hyperglycemia, without long-term current use of insulin (H)       glimepiride 4 MG tablet    AMARYL    90 tablet    Take 1 tablet (4 mg) by mouth every morning (before breakfast)    Type 2 diabetes mellitus with hyperglycemia, without long-term current use of insulin (H)       lisinopril 40 MG tablet    PRINIVIL/ZESTRIL    90 tablet    Take 1 tablet (40 mg) by mouth daily    Essential hypertension with goal blood pressure less than 140/90       metFORMIN 500 MG 24 hr tablet    GLUCOPHAGE-XR    360 tablet    1 tab twice daily    Type 2 diabetes mellitus with hyperglycemia, without long-term current use of insulin (H)       order for DME     1 Units    C pap machine with tubing and nasal pillows    IVANIA (obstructive sleep apnea)       order for DME     1 Device    Equipment being ordered: Please dispense CPAP supplies: nasal pillows, tubing, and head gear. Telephone: [phone] Fax:[phone]    IVANIA (obstructive sleep apnea)       simvastatin 40 MG tablet    ZOCOR    90 tablet    Take 1  tablet (40 mg) by mouth At Bedtime    Hyperlipidemia LDL goal <100

## 2017-06-26 NOTE — NURSING NOTE
"Chief Complaint   Patient presents with     Diabetes     Recheck       Initial /86 (BP Location: Right arm, Patient Position: Chair, Cuff Size: Adult Large)  Pulse 72  Ht 6' 3.5\" (1.918 m)  Wt 273 lb 6.4 oz (124 kg)  BMI 33.72 kg/m2 Estimated body mass index is 33.72 kg/(m^2) as calculated from the following:    Height as of this encounter: 6' 3.5\" (1.918 m).    Weight as of this encounter: 273 lb 6.4 oz (124 kg).  Medication Reconciliation: complete    "

## 2017-09-08 ENCOUNTER — TELEPHONE (OUTPATIENT)
Dept: FAMILY MEDICINE | Facility: CLINIC | Age: 57
End: 2017-09-08

## 2017-09-08 DIAGNOSIS — E11.65 TYPE 2 DIABETES MELLITUS WITH HYPERGLYCEMIA, WITHOUT LONG-TERM CURRENT USE OF INSULIN (H): ICD-10-CM

## 2017-09-08 RX ORDER — GLIMEPIRIDE 4 MG/1
4 TABLET ORAL
Qty: 14 TABLET | Refills: 0 | Status: SHIPPED | OUTPATIENT
Start: 2017-09-08 | End: 2017-11-28

## 2017-09-08 NOTE — TELEPHONE ENCOUNTER
Reason for Call:  Other prescription    Detailed comments: Pt did not get his Rx from his mail order pharmacy and he is almost out of med.  He is asking that a 2 week supply of Glimepiride Rx be sent to [facility] to get him through until mail order Rx comes.  No need to call patient back, unless there are questions or problems.      Phone Number Patient can be reached at: Home number on file [phone] (home)    Best Time: any    Can we leave a detailed message on this number? YES    Call taken on 9/8/2017 at 1:17 PM by Moriah Gill

## 2017-09-09 DIAGNOSIS — E11.65 TYPE 2 DIABETES MELLITUS WITH HYPERGLYCEMIA, WITHOUT LONG-TERM CURRENT USE OF INSULIN (H): ICD-10-CM

## 2017-09-14 DIAGNOSIS — E11.65 TYPE 2 DIABETES MELLITUS WITH HYPERGLYCEMIA, WITHOUT LONG-TERM CURRENT USE OF INSULIN (H): ICD-10-CM

## 2017-09-14 RX ORDER — METFORMIN HCL 500 MG
TABLET, EXTENDED RELEASE 24 HR ORAL
Qty: 360 TABLET | Refills: 1 | OUTPATIENT
Start: 2017-09-14

## 2017-09-14 NOTE — TELEPHONE ENCOUNTER
Test Strips     Last Written Prescription Date: 10/06/16  Last Fill Quantity: 100,  # refills: 11   Last Office Visit with G, [facility] or [facility] prescribing provider: 06/26/17

## 2017-11-18 DIAGNOSIS — B00.9 HERPES SIMPLEX VIRUS INFECTION: ICD-10-CM

## 2017-11-20 RX ORDER — ACYCLOVIR 800 MG/1
TABLET ORAL
Qty: 35 TABLET | Refills: 0 | Status: SHIPPED | OUTPATIENT
Start: 2017-11-20 | End: 2018-01-02

## 2017-11-22 ENCOUNTER — TRANSFERRED RECORDS (OUTPATIENT)
Dept: HEALTH INFORMATION MANAGEMENT | Facility: CLINIC | Age: 57
End: 2017-11-22

## 2017-11-27 DIAGNOSIS — E11.65 TYPE 2 DIABETES MELLITUS WITH HYPERGLYCEMIA, WITHOUT LONG-TERM CURRENT USE OF INSULIN (H): ICD-10-CM

## 2017-11-28 ENCOUNTER — OFFICE VISIT (OUTPATIENT)
Dept: FAMILY MEDICINE | Facility: CLINIC | Age: 57
End: 2017-11-28
Payer: COMMERCIAL

## 2017-11-28 VITALS
HEIGHT: 75 IN | HEART RATE: 84 BPM | DIASTOLIC BLOOD PRESSURE: 80 MMHG | BODY MASS INDEX: 34.54 KG/M2 | WEIGHT: 277.8 LBS | SYSTOLIC BLOOD PRESSURE: 130 MMHG

## 2017-11-28 DIAGNOSIS — K76.0 FATTY LIVER: ICD-10-CM

## 2017-11-28 DIAGNOSIS — Z23 NEED FOR VACCINATION: ICD-10-CM

## 2017-11-28 DIAGNOSIS — I10 ESSENTIAL HYPERTENSION WITH GOAL BLOOD PRESSURE LESS THAN 140/90: ICD-10-CM

## 2017-11-28 DIAGNOSIS — Z11.59 NEED FOR HEPATITIS C SCREENING TEST: ICD-10-CM

## 2017-11-28 DIAGNOSIS — E11.65 TYPE 2 DIABETES MELLITUS WITH HYPERGLYCEMIA, WITHOUT LONG-TERM CURRENT USE OF INSULIN (H): Primary | ICD-10-CM

## 2017-11-28 DIAGNOSIS — E78.5 HYPERLIPIDEMIA LDL GOAL <100: ICD-10-CM

## 2017-11-28 LAB — HBA1C MFR BLD: 7.3 % (ref 4.3–6)

## 2017-11-28 PROCEDURE — 84439 ASSAY OF FREE THYROXINE: CPT | Performed by: FAMILY MEDICINE

## 2017-11-28 PROCEDURE — 99214 OFFICE O/P EST MOD 30 MIN: CPT | Mod: 25 | Performed by: FAMILY MEDICINE

## 2017-11-28 PROCEDURE — 36415 COLL VENOUS BLD VENIPUNCTURE: CPT | Performed by: FAMILY MEDICINE

## 2017-11-28 PROCEDURE — 83036 HEMOGLOBIN GLYCOSYLATED A1C: CPT | Performed by: FAMILY MEDICINE

## 2017-11-28 PROCEDURE — 84443 ASSAY THYROID STIM HORMONE: CPT | Performed by: FAMILY MEDICINE

## 2017-11-28 PROCEDURE — 90715 TDAP VACCINE 7 YRS/> IM: CPT | Performed by: FAMILY MEDICINE

## 2017-11-28 PROCEDURE — 90471 IMMUNIZATION ADMIN: CPT | Performed by: FAMILY MEDICINE

## 2017-11-28 PROCEDURE — 80076 HEPATIC FUNCTION PANEL: CPT | Performed by: FAMILY MEDICINE

## 2017-11-28 PROCEDURE — 86803 HEPATITIS C AB TEST: CPT | Performed by: FAMILY MEDICINE

## 2017-11-28 RX ORDER — GLIMEPIRIDE 4 MG/1
TABLET ORAL
Qty: 28 TABLET | Refills: 0 | Status: SHIPPED | OUTPATIENT
Start: 2017-11-28 | End: 2017-11-28

## 2017-11-28 RX ORDER — GLIMEPIRIDE 4 MG/1
TABLET ORAL
Qty: 180 TABLET | Refills: 1 | Status: SHIPPED | OUTPATIENT
Start: 2017-11-28 | End: 2018-05-11 | Stop reason: ALTCHOICE

## 2017-11-28 RX ORDER — METFORMIN HCL 500 MG
TABLET, EXTENDED RELEASE 24 HR ORAL
Qty: 360 TABLET | Refills: 1 | Status: SHIPPED | OUTPATIENT
Start: 2017-11-28 | End: 2018-05-11

## 2017-11-28 RX ORDER — SIMVASTATIN 40 MG
40 TABLET ORAL AT BEDTIME
Qty: 90 TABLET | Refills: 1 | Status: SHIPPED | OUTPATIENT
Start: 2017-11-28 | End: 2018-05-11

## 2017-11-28 RX ORDER — LISINOPRIL 40 MG/1
40 TABLET ORAL DAILY
Qty: 90 TABLET | Refills: 1 | Status: SHIPPED | OUTPATIENT
Start: 2017-11-28 | End: 2018-05-11

## 2017-11-28 NOTE — NURSING NOTE
"Chief Complaint   Patient presents with     Diabetes     Lipids     Hypertension       Initial /78 (BP Location: Right arm, Patient Position: Chair, Cuff Size: Adult Large)  Pulse 84  Ht 6' 3\" (1.905 m)  Wt 277 lb 12.8 oz (126 kg)  BMI 34.72 kg/m2 Estimated body mass index is 34.72 kg/(m^2) as calculated from the following:    Height as of this encounter: 6' 3\" (1.905 m).    Weight as of this encounter: 277 lb 12.8 oz (126 kg).  Medication Reconciliation: complete     Diane Feldman, CMA      "

## 2017-11-28 NOTE — PROGRESS NOTES
"  SUBJECTIVE:   Mat Palmer is a 57 year old male who presents to clinic today for the following health issues:      Diabetes Follow-up    Patient is checking blood sugars: once daily.  Results are as follows:       Varies -- 190-240        Diabetic concerns: blood sugar frequently over 200     Symptoms of hypoglycemia (low blood sugar): none     Paresthesias (numbness or burning in feet) or sores: No     Date of last diabetic eye exam: last week    At times he has taken 2 tablets a day of the glimepiride because his sugars were running high.    Hyperlipidemia Follow-Up      Rate your low fat/cholesterol diet?: fair    Taking statin?  Yes, no muscle aches from statin    Other lipid medications/supplements?:  none    Hypertension Follow-up      Outpatient blood pressures are not being checked.  Low Salt Diet: no added salt      Amount of exercise or physical activity: None    Problems taking medications regularly: No    Medication side effects: none    Diet: regular (no restrictions)            Problem list and histories reviewed & adjusted, as indicated.  Additional history: none        Reviewed and updated as needed this visit by clinical staffTobacco  Allergies  Med Hx  Surg Hx  Fam Hx  Soc Hx      Reviewed and updated as needed this visit by Provider               ROS:  Constitutional, HEENT, cardiovascular, pulmonary, gi and gu systems are negative, except as otherwise noted.          OBJECTIVE:                                                    /80 (BP Location: Right arm, Patient Position: Chair, Cuff Size: Adult Large)  Pulse 84  Ht 6' 3\" (1.905 m)  Wt 277 lb 12.8 oz (126 kg)  BMI 34.72 kg/m2    GENERAL: healthy, alert and no distress  EYES: Eyes grossly normal to inspection, extraocular movements - intact, and PERRL  NECK: no tenderness, no adenopathy, no asymmetry, no masses, no stiffness; thyroid- normal to palpation  RESP: lungs clear to auscultation - no rales, no rhonchi, no " wheezes  CV: regular rates and rhythm, normal S1 S2, no S3 or S4 and no murmur, no click or rub -  MS: extremities- no gross deformities noted, no edema  Diabetic foot exam: no trophic changes or ulcerative lesions and normal monofilament exam    Diagnostic test results:  Results for orders placed or performed in visit on 11/28/17 (from the past 24 hour(s))   Hemoglobin A1c   Result Value Ref Range    Hemoglobin A1C 7.3 (H) 4.3 - 6.0 %          ASSESSMENT/PLAN:                                                    ASSESSMENT:  1. Type 2 diabetes mellitus with hyperglycemia, without long-term current use of insulin (H)    2. Hyperlipidemia LDL goal <100    3. Essential hypertension with goal blood pressure less than 140/90    4. Fatty liver    5. Need for hepatitis C screening test    6. Need for vaccination        PLAN:  Orders Placed This Encounter     1st  Administration  [11112]     TDAP VACCINE (ADACEL) [33046.002]     Hemoglobin A1c     TSH with free T4 reflex     Hepatic panel     Hepatitis C antibody     DISCONTD: glimepiride (AMARYL) 4 MG tablet     glimepiride (AMARYL) 4 MG tablet     metFORMIN (GLUCOPHAGE-XR) 500 MG 24 hr tablet     simvastatin (ZOCOR) 40 MG tablet     lisinopril (PRINIVIL/ZESTRIL) 40 MG tablet       Patient Instructions   Diabetes Plan  1. Hemoglobin A1c goal is between 7% and 8%  Your Hemoglobin A1c is at goal  Plan is:Increase glimepiride to 8 mg (2 daily)  2. LDL (bad cholesterol) goal is less than 100  Your LDL is at goal  Plan is: Continue medications at current doses  3. Blood pressure goal is less than 140/90.  Your blood pressure is at goal.  Plan is: Continue medications at current doses  Recheck in 6 months.                  MICHELLE Julian Post  [facility]

## 2017-11-28 NOTE — PATIENT INSTRUCTIONS
Diabetes Plan  1. Hemoglobin A1c goal is between 7% and 8%  Your Hemoglobin A1c is at goal  Plan is:Increase glimepiride to 8 mg (2 daily)  2. LDL (bad cholesterol) goal is less than 100  Your LDL is at goal  Plan is: Continue medications at current doses  3. Blood pressure goal is less than 140/90.  Your blood pressure is at goal.  Plan is: Continue medications at current doses  Recheck in 6 months.

## 2017-11-28 NOTE — MR AVS SNAPSHOT
After Visit Summary   11/28/2017    Mat Palmer    MRN: [id number]           Patient Information     Date Of Birth          1960        Visit Information        Provider Department      11/28/2017 3:40 PM MICHELLE Barragan MD [facility]        Today's Diagnoses     Type 2 diabetes mellitus with hyperglycemia, without long-term current use of insulin (H)    -  1    Hyperlipidemia LDL goal <100        Essential hypertension with goal blood pressure less than 140/90        Fatty liver          Care Instructions    Diabetes Plan  1. Hemoglobin A1c goal is between 7% and 8%  Your Hemoglobin A1c is at goal  Plan is:Increase glimepiride to 8 mg (2 daily)  2. LDL (bad cholesterol) goal is less than 100  Your LDL is at goal  Plan is: Continue medications at current doses  3. Blood pressure goal is less than 140/90.  Your blood pressure is at goal.  Plan is: Continue medications at current doses  Recheck in 6 months.                      Follow-ups after your visit        Who to contact     If you have questions or need follow up information about today's clinic visit or your schedule please contact [facility] directly at [phone].  Normal or non-critical lab and imaging results will be communicated to you by Talari Networkshart, letter or phone within 4 business days after the clinic has received the results. If you do not hear from us within 7 days, please contact the clinic through [employer] or phone. If you have a critical or abnormal lab result, we will notify you by phone as soon as possible.  Submit refill requests through [employer] or call your pharmacy and they will forward the refill request to us. Please allow 3 business days for your refill to be completed.          Additional Information About Your Visit        Talari Networks[employer] Information     [employer] gives you secure access to your electronic health record. If you see a primary care provider, you can also send messages to your  "care team and make appointments. If you have questions, please call your primary care clinic.  If you do not have a primary care provider, please call [phone] and they will assist you.        Care EveryWhere ID     This is your Care EveryWhere ID. This could be used by other organizations to access your [place] medical records  [id number]        Your Vitals Were     Pulse Height BMI (Body Mass Index)             84 6' 3\" (1.905 m) 34.72 kg/m2          Blood Pressure from Last 3 Encounters:   11/28/17 130/80   06/26/17 138/86   02/02/17 136/86    Weight from Last 3 Encounters:   11/28/17 277 lb 12.8 oz (126 kg)   06/26/17 273 lb 6.4 oz (124 kg)   02/02/17 273 lb 11.2 oz (124.1 kg)              We Performed the Following     Hemoglobin A1c     Hepatic panel     TSH with free T4 reflex          Today's Medication Changes          These changes are accurate as of: 11/28/17  4:29 PM.  If you have any questions, ask your nurse or doctor.               Start taking these medicines.        Dose/Directions    glimepiride 4 MG tablet   Commonly known as:  AMARYL   Used for:  Type 2 diabetes mellitus with hyperglycemia, without long-term current use of insulin (H)   Started by:  MICHELLE Barragan MD        Take 2 tablets in the morning   Quantity:  180 tablet   Refills:  1         These medicines have changed or have updated prescriptions.        Dose/Directions    metFORMIN 500 MG 24 hr tablet   Commonly known as:  GLUCOPHAGE-XR   This may have changed:  additional instructions   Used for:  Type 2 diabetes mellitus with hyperglycemia, without long-term current use of insulin (H)   Changed by:  MICHELLE Barragan MD        2 tabs twice daily   Quantity:  360 tablet   Refills:  1            Where to get your medicines      These medications were sent to [facility] - [address] [address]     Phone:  [phone]     glimepiride 4 MG tablet    " lisinopril 40 MG tablet    metFORMIN 500 MG 24 hr tablet    simvastatin 40 MG tablet                Primary Care Provider Office Phone # Fax #    R Eliel Barragan [phone] [address] [facility] [address]        Equal Access to Services     MIKEYEMILY EZRA : Hadii neeta ku haddurgao Soomaali, waaxda luqadaha, qaybta kaalmada adeegyada, waxradha idiin haycharun adequan rivers lamylesberto sean. So [facility] [phone].    ATENCIÓN: Si habla español, tiene a soto disposición servicios gratuitos de asistencia lingüística. Llame al [phone].    We comply with applicable federal civil rights laws and [place] laws. We do not discriminate on the basis of race, color, national origin, age, disability, sex, sexual orientation, or gender identity.            Thank you!     Thank you for choosing [facility]  for your care. Our goal is always to provide you with excellent care. Hearing back from our patients is one way we can continue to improve our services. Please take a few minutes to complete the written survey that you may receive in the mail after your visit with us. Thank you!             Your Updated Medication List - Protect others around you: Learn how to safely use, store and throw away your medicines at www.disposemymeds.org.          This list is accurate as of: 11/28/17  4:29 PM.  Always use your most recent med list.                   Brand Name Dispense Instructions for use Diagnosis    acyclovir 800 MG tablet    ZOVIRAX    35 tablet    TAKE ONE TABLET BY MOUTH FIVE TIMES A DAY    Herpes simplex virus infection       aspirin 81 MG tablet     100 tablet    Take 1 tablet by mouth daily.    Type 2 diabetes, HbA1c goal < 7% (H)       blood glucose monitoring lancets     102 each    1 each daily.    Type 2 diabetes, HbA1c goal < 7% (H)       glimepiride 4 MG tablet    AMARYL    180 tablet    Take 2 tablets in the morning    Type 2 diabetes mellitus with hyperglycemia, without long-term  current use of insulin (H)       lisinopril 40 MG tablet    PRINIVIL/ZESTRIL    90 tablet    Take 1 tablet (40 mg) by mouth daily    Essential hypertension with goal blood pressure less than 140/90       metFORMIN 500 MG 24 hr tablet    GLUCOPHAGE-XR    360 tablet    2 tabs twice daily    Type 2 diabetes mellitus with hyperglycemia, without long-term current use of insulin (H)       ONETOUCH ULTRA test strip   Generic drug:  blood glucose monitoring     100 each    TEST ONCE DAILY AND AS NEEDED    Type 2 diabetes mellitus with hyperglycemia, without long-term current use of insulin (H)       order for DME     1 Units    C pap machine with tubing and nasal pillows    IVANIA (obstructive sleep apnea)       order for DME     1 Device    Equipment being ordered: Please dispense CPAP supplies: nasal pillows, tubing, and head gear. Telephone: [phone] Fax:[phone]    IVANIA (obstructive sleep apnea)       simvastatin 40 MG tablet    ZOCOR    90 tablet    Take 1 tablet (40 mg) by mouth At Bedtime    Hyperlipidemia LDL goal <100

## 2017-11-29 LAB
ALBUMIN SERPL-MCNC: 4.4 G/DL (ref 3.4–5)
ALP SERPL-CCNC: 70 U/L (ref 40–150)
ALT SERPL W P-5'-P-CCNC: 49 U/L (ref 0–70)
AST SERPL W P-5'-P-CCNC: 24 U/L (ref 0–45)
BILIRUB DIRECT SERPL-MCNC: 0.1 MG/DL (ref 0–0.2)
BILIRUB SERPL-MCNC: 0.4 MG/DL (ref 0.2–1.3)
HCV AB SERPL QL IA: NONREACTIVE
PROT SERPL-MCNC: 7.8 G/DL (ref 6.8–8.8)
T4 FREE SERPL-MCNC: 0.89 NG/DL (ref 0.76–1.46)
TSH SERPL DL<=0.005 MIU/L-ACNC: 4.57 MU/L (ref 0.4–4)

## 2017-11-30 NOTE — PROGRESS NOTES
Mat,  We already discussed the glycosylated hemoglobin. The rest of your labs are fine except for a slightly elevated TSH.       Eliel Barragan MD

## 2018-01-02 DIAGNOSIS — B00.9 HERPES SIMPLEX VIRUS INFECTION: ICD-10-CM

## 2018-01-04 RX ORDER — ACYCLOVIR 800 MG/1
TABLET ORAL
Qty: 35 TABLET | Refills: 0 | Status: SHIPPED | OUTPATIENT
Start: 2018-01-04 | End: 2018-05-11

## 2018-05-07 ENCOUNTER — TELEPHONE (OUTPATIENT)
Dept: PEDIATRICS | Facility: CLINIC | Age: 58
End: 2018-05-07

## 2018-05-07 DIAGNOSIS — Z13.9 SCREENING FOR CONDITION: ICD-10-CM

## 2018-05-07 DIAGNOSIS — E66.09 NON MORBID OBESITY DUE TO EXCESS CALORIES: Primary | ICD-10-CM

## 2018-05-07 DIAGNOSIS — E78.5 HYPERLIPIDEMIA LDL GOAL <100: ICD-10-CM

## 2018-05-07 DIAGNOSIS — E11.65 TYPE 2 DIABETES MELLITUS WITH HYPERGLYCEMIA (H): ICD-10-CM

## 2018-05-07 DIAGNOSIS — I10 ESSENTIAL HYPERTENSION WITH GOAL BLOOD PRESSURE LESS THAN 140/90: ICD-10-CM

## 2018-05-07 NOTE — TELEPHONE ENCOUNTER
Patient called stating that has an upcoming OV with Dr Barragan, requesting to do labs on Wednesday, however needs orders.     [facility]

## 2018-05-09 DIAGNOSIS — Z13.9 SCREENING FOR CONDITION: ICD-10-CM

## 2018-05-09 DIAGNOSIS — I10 ESSENTIAL HYPERTENSION WITH GOAL BLOOD PRESSURE LESS THAN 140/90: ICD-10-CM

## 2018-05-09 DIAGNOSIS — E11.65 TYPE 2 DIABETES MELLITUS WITH HYPERGLYCEMIA (H): ICD-10-CM

## 2018-05-09 DIAGNOSIS — E78.5 HYPERLIPIDEMIA LDL GOAL <100: ICD-10-CM

## 2018-05-09 DIAGNOSIS — E66.09 NON MORBID OBESITY DUE TO EXCESS CALORIES: ICD-10-CM

## 2018-05-09 DIAGNOSIS — E11.65 TYPE 2 DIABETES MELLITUS WITH HYPERGLYCEMIA, WITHOUT LONG-TERM CURRENT USE OF INSULIN (H): ICD-10-CM

## 2018-05-09 LAB
ANION GAP SERPL CALCULATED.3IONS-SCNC: 6 MMOL/L (ref 3–14)
BUN SERPL-MCNC: 19 MG/DL (ref 7–30)
CALCIUM SERPL-MCNC: 8.9 MG/DL (ref 8.5–10.1)
CHLORIDE SERPL-SCNC: 102 MMOL/L (ref 94–109)
CHOLEST SERPL-MCNC: 145 MG/DL
CO2 SERPL-SCNC: 26 MMOL/L (ref 20–32)
CREAT SERPL-MCNC: 0.83 MG/DL (ref 0.66–1.25)
GFR SERPL CREATININE-BSD FRML MDRD: >90 ML/MIN/1.7M2
GLUCOSE SERPL-MCNC: 253 MG/DL (ref 70–99)
HBA1C MFR BLD: 8.4 % (ref 0–5.6)
HDLC SERPL-MCNC: 49 MG/DL
LDLC SERPL CALC-MCNC: 54 MG/DL
NONHDLC SERPL-MCNC: 96 MG/DL
POTASSIUM SERPL-SCNC: 4.5 MMOL/L (ref 3.4–5.3)
SODIUM SERPL-SCNC: 134 MMOL/L (ref 133–144)
T4 FREE SERPL-MCNC: 0.77 NG/DL (ref 0.76–1.46)
TRIGL SERPL-MCNC: 212 MG/DL
TSH SERPL DL<=0.005 MIU/L-ACNC: 4.82 MU/L (ref 0.4–4)

## 2018-05-09 PROCEDURE — 80048 BASIC METABOLIC PNL TOTAL CA: CPT | Performed by: FAMILY MEDICINE

## 2018-05-09 PROCEDURE — 83036 HEMOGLOBIN GLYCOSYLATED A1C: CPT | Performed by: FAMILY MEDICINE

## 2018-05-09 PROCEDURE — 84439 ASSAY OF FREE THYROXINE: CPT | Performed by: FAMILY MEDICINE

## 2018-05-09 PROCEDURE — 80061 LIPID PANEL: CPT | Performed by: FAMILY MEDICINE

## 2018-05-09 PROCEDURE — 36415 COLL VENOUS BLD VENIPUNCTURE: CPT | Performed by: FAMILY MEDICINE

## 2018-05-09 PROCEDURE — 84443 ASSAY THYROID STIM HORMONE: CPT | Performed by: FAMILY MEDICINE

## 2018-05-09 NOTE — TELEPHONE ENCOUNTER
"Routing refill request to provider for review/approval because:  Labs out of range:  A1C greater than 8  Labs not current:  microalbumin has not been within last year - had labs today however this was not included.  Has appt with you 5-11-18    Requested Prescriptions   Pending Prescriptions Disp Refills     glimepiride (AMARYL) 4 MG tablet [Pharmacy Med Name: GLIMEPIRIDE TABS 4MG] 180 tablet 1     Sig: TAKE 2 TABLETS IN THE MORNING    Sulfonylurea Agents Failed    5/9/2018 12:59 AM       Failed - Patient has had a Microalbumin in the past 12 mos.    Recent Labs   Lab Test  11/27/15   0901   MICROL  24   UMALCR  19.92*            Passed - Blood pressure less than 140/90 in past 6 months    BP Readings from Last 3 Encounters:   11/28/17 130/80   06/26/17 138/86   02/02/17 136/86                Passed - Patient has documented LDL within the past 12 mos.    Recent Labs   Lab Test  05/09/18   0758   LDL  54            Passed - Patient has documented A1c within the specified period of time.    Recent Labs   Lab Test  05/09/18   0758   A1C  8.4*            Passed - Patient is age 18 or older       Passed - Patient has a recent creatinine (normal) within the past 12 mos.    Recent Labs   Lab Test  05/09/18   0758   CR  0.83            Passed - Recent (6 mo) or future (30 days) visit within the authorizing provider's specialty    Patient had office visit in the last 6 months or has a visit in the next 30 days with authorizing provider or within the authorizing provider's specialty.  See \"Patient Info\" tab in inbasket, or \"Choose Columns\" in Meds & Orders section of the refill encounter.            Last Written Prescription Date:  11-28-17  Last Fill Quantity: 180,  # refills: 1   Last office visit: 11/28/2017 with prescribing provider:  11-28-17   Future Office Visit:   Next 5 appointments (look out 90 days)     May 11, 2018  1:20 PM CDT   Office Visit with MICHELLE Barragan MD   [facility] ([facility]    [address]  [facility] [address]   [phone]

## 2018-05-10 NOTE — PROGRESS NOTES
Mat,  Your labs are all in the acceptable range except the glycosylated hemoglobin has jumped up to 8.4, over the goal of less than 8.0. We will discuss at your appointment on Friday how to get you down to goal.      Eliel Barragan MD

## 2018-05-11 ENCOUNTER — OFFICE VISIT (OUTPATIENT)
Dept: FAMILY MEDICINE | Facility: CLINIC | Age: 58
End: 2018-05-11
Payer: COMMERCIAL

## 2018-05-11 VITALS
HEIGHT: 75 IN | RESPIRATION RATE: 16 BRPM | WEIGHT: 275.6 LBS | BODY MASS INDEX: 34.27 KG/M2 | HEART RATE: 76 BPM | SYSTOLIC BLOOD PRESSURE: 122 MMHG | DIASTOLIC BLOOD PRESSURE: 77 MMHG | TEMPERATURE: 98.6 F

## 2018-05-11 DIAGNOSIS — E11.65 TYPE 2 DIABETES MELLITUS WITH HYPERGLYCEMIA, WITHOUT LONG-TERM CURRENT USE OF INSULIN (H): Primary | ICD-10-CM

## 2018-05-11 DIAGNOSIS — I10 ESSENTIAL HYPERTENSION WITH GOAL BLOOD PRESSURE LESS THAN 140/90: ICD-10-CM

## 2018-05-11 DIAGNOSIS — B00.9 HERPES SIMPLEX VIRUS INFECTION: ICD-10-CM

## 2018-05-11 DIAGNOSIS — E78.5 HYPERLIPIDEMIA LDL GOAL <100: ICD-10-CM

## 2018-05-11 PROCEDURE — 99214 OFFICE O/P EST MOD 30 MIN: CPT | Performed by: FAMILY MEDICINE

## 2018-05-11 RX ORDER — SIMVASTATIN 40 MG
40 TABLET ORAL AT BEDTIME
Qty: 90 TABLET | Refills: 1 | Status: SHIPPED | OUTPATIENT
Start: 2018-05-11 | End: 2018-11-20

## 2018-05-11 RX ORDER — ACYCLOVIR 800 MG/1
TABLET ORAL
Qty: 35 TABLET | Refills: 11 | Status: SHIPPED | OUTPATIENT
Start: 2018-05-11

## 2018-05-11 RX ORDER — GLIMEPIRIDE 4 MG/1
TABLET ORAL
Qty: 180 TABLET | Refills: 1 | Status: CANCELLED | OUTPATIENT
Start: 2018-05-11

## 2018-05-11 RX ORDER — GLIMEPIRIDE 4 MG/1
TABLET ORAL
Qty: 180 TABLET | Refills: 0 | OUTPATIENT
Start: 2018-05-11

## 2018-05-11 RX ORDER — ACYCLOVIR 800 MG/1
TABLET ORAL
Qty: 35 TABLET | Refills: 0 | Status: CANCELLED | OUTPATIENT
Start: 2018-05-11

## 2018-05-11 RX ORDER — LISINOPRIL 40 MG/1
40 TABLET ORAL DAILY
Qty: 90 TABLET | Refills: 1 | Status: SHIPPED | OUTPATIENT
Start: 2018-05-11 | End: 2018-11-20

## 2018-05-11 RX ORDER — METFORMIN HCL 500 MG
TABLET, EXTENDED RELEASE 24 HR ORAL
Qty: 360 TABLET | Refills: 1 | Status: SHIPPED | OUTPATIENT
Start: 2018-05-11

## 2018-05-11 NOTE — MR AVS SNAPSHOT
After Visit Summary   5/11/2018    Mat Palmer    MRN: [id number]           Patient Information     Date Of Birth          1960        Visit Information        Provider Department      5/11/2018 1:20 PM Yung, MICHELLE Julian MD [facility]        Today's Diagnoses     Herpes simplex virus infection        Type 2 diabetes mellitus with hyperglycemia, without long-term current use of insulin (H)        Hyperlipidemia LDL goal <100        Essential hypertension with goal blood pressure less than 140/90           Follow-ups after your visit        Who to contact     If you have questions or need follow up information about today's clinic visit or your schedule please contact [facility] directly at [phone].  Normal or non-critical lab and imaging results will be communicated to you by MyChart, letter or phone within 4 business days after the clinic has received the results. If you do not hear from us within 7 days, please contact the clinic through [employer] or phone. If you have a critical or abnormal lab result, we will notify you by phone as soon as possible.  Submit refill requests through [employer] or call your pharmacy and they will forward the refill request to us. Please allow 3 business days for your refill to be completed.          Additional Information About Your Visit        MyChart Information     [employer] gives you secure access to your electronic health record. If you see a primary care provider, you can also send messages to your care team and make appointments. If you have questions, please call your primary care clinic.  If you do not have a primary care provider, please call [phone] and they will assist you.        Care EveryWhere ID     This is your Care EveryWhere ID. This could be used by other organizations to access your [facility] medical records  [id number]        Your Vitals Were     Pulse Temperature Respirations Height BMI (Body Mass  "Index)       76 98.6  F (37  C) (Tympanic) 16 6' 3\" (1.905 m) 34.45 kg/m2        Blood Pressure from Last 3 Encounters:   05/11/18 122/77   11/28/17 130/80   06/26/17 138/86    Weight from Last 3 Encounters:   05/11/18 275 lb 9.6 oz (125 kg)   11/28/17 277 lb 12.8 oz (126 kg)   06/26/17 273 lb 6.4 oz (124 kg)              Today, you had the following     No orders found for display         Today's Medication Changes          These changes are accurate as of 5/11/18  2:11 PM.  If you have any questions, ask your nurse or doctor.               Start taking these medicines.        Dose/Directions    dulaglutide 0.75 MG/0.5ML pen   Commonly known as:  TRULICITY   Used for:  Type 2 diabetes mellitus with hyperglycemia, without long-term current use of insulin (H)        Dose:  0.75 mg   Inject 0.75 mg Subcutaneous every 7 days   Quantity:  6 mL   Refills:  3         These medicines have changed or have updated prescriptions.        Dose/Directions    acyclovir 800 MG tablet   Commonly known as:  ZOVIRAX   This may have changed:  See the new instructions.   Used for:  Herpes simplex virus infection        TAKE ONE TABLET BY MOUTH FIVE TIMES A DAY   Quantity:  35 tablet   Refills:  11       blood glucose monitoring test strip   Commonly known as:  ONETOUCH ULTRA   This may have changed:  See the new instructions.   Used for:  Type 2 diabetes mellitus with hyperglycemia, without long-term current use of insulin (H)        Dose:  1 strip   1 strip by In Vitro route 2 times daily or as directed.   Quantity:  200 each   Refills:  3         Stop taking these medicines if you haven't already. Please contact your care team if you have questions.     glimepiride 4 MG tablet   Commonly known as:  AMARYL                Where to get your medicines      These medications were sent to [facility] - [place], [place] - [address] [place] [id number]     Phone:  [phone]     blood " glucose monitoring test strip    dulaglutide 0.75 MG/0.5ML pen    lisinopril 40 MG tablet    metFORMIN 500 MG 24 hr tablet    simvastatin 40 MG tablet         These medications were sent to [place], [place] - [address]  [address] [facility] [address]     Phone:  [phone]     acyclovir 800 MG tablet                Primary Care Provider Office Phone # Fax #    R Eliel Barragan [phone] [address] [facility] [address]        Equal Access to Services     [facility]: Hadii aad ku hadasho Soomaali, waaxda luqadaha, qaybta kaalmada adeegyada, waxay idiin hayaan adeeg khdeniz dinero . So [facility] [phone].    ATENCIÓN: Si habla español, tiene a soto disposición servicios gratuitos de asistencia lingüística. [facility] [phone].    We comply with applicable federal civil rights laws and [place] laws. We do not discriminate on the basis of race, color, national origin, age, disability, sex, sexual orientation, or gender identity.            Thank you!     Thank you for choosing [facility]  for your care. Our goal is always to provide you with excellent care. Hearing back from our patients is one way we can continue to improve our services. Please take a few minutes to complete the written survey that you may receive in the mail after your visit with us. Thank you!             Your Updated Medication List - Protect others around you: Learn how to safely use, store and throw away your medicines at www.disposemymeds.org.          This list is accurate as of 5/11/18  2:11 PM.  Always use your most recent med list.                   Brand Name Dispense Instructions for use Diagnosis    acyclovir 800 MG tablet    ZOVIRAX    35 tablet    TAKE ONE TABLET BY MOUTH FIVE TIMES A DAY    Herpes simplex virus infection       aspirin 81 MG tablet     100 tablet    Take 1 tablet by mouth daily.    Type 2 diabetes,  HbA1c goal < 7% (H)       blood glucose monitoring lancets     102 each    1 each daily.    Type 2 diabetes, HbA1c goal < 7% (H)       blood glucose monitoring test strip    ONETOUCH ULTRA    200 each    1 strip by In Vitro route 2 times daily or as directed.    Type 2 diabetes mellitus with hyperglycemia, without long-term current use of insulin (H)       dulaglutide 0.75 MG/0.5ML pen    TRULICITY    6 mL    Inject 0.75 mg Subcutaneous every 7 days    Type 2 diabetes mellitus with hyperglycemia, without long-term current use of insulin (H)       lisinopril 40 MG tablet    PRINIVIL/ZESTRIL    90 tablet    Take 1 tablet (40 mg) by mouth daily    Essential hypertension with goal blood pressure less than 140/90       metFORMIN 500 MG 24 hr tablet    GLUCOPHAGE-XR    360 tablet    2 tabs twice daily    Type 2 diabetes mellitus with hyperglycemia, without long-term current use of insulin (H)       order for DME     1 Units    C pap machine with tubing and nasal pillows    IVANIA (obstructive sleep apnea)       order for DME     1 Device    Equipment being ordered: Please dispense CPAP supplies: nasal pillows, tubing, and head gear. Telephone: [phone] Fax:[phone]    IVANIA (obstructive sleep apnea)       simvastatin 40 MG tablet    ZOCOR    90 tablet    Take 1 tablet (40 mg) by mouth At Bedtime    Hyperlipidemia LDL goal <100

## 2018-05-11 NOTE — PROGRESS NOTES
SUBJECTIVE:   Mat Palmer is a 57 year old male who presents to clinic today for the following health issues:      Diabetes Follow-up      Patient is checking blood sugars: not at all, usually once a day    Diabetic concerns: See below     Symptoms of hypoglycemia (low blood sugar): none     Paresthesias (numbness or burning in feet) or sores: No     Date of last diabetic eye exam: 6 months ago    BP Readings from Last 2 Encounters:   05/11/18 122/77   11/28/17 130/80     Hemoglobin A1C (%)   Date Value   05/09/2018 8.4 (H)   11/28/2017 7.3 (H)     LDL Cholesterol Calculated (mg/dL)   Date Value   05/09/2018 54   06/23/2017 78     He was not surprised to see that the glycosylated hemoglobin was high this time, as his blood sugars have been running higher in the past.  He has not changed anything about his diet and is quite diligent about limiting carbs.  He just does not think that the glimepiride has been helping much and would be open to trying a different medication      Hyperlipidemia Follow-Up      Rate your low fat/cholesterol diet?: fair    Taking statin?  Yes, no muscle aches from statin    Other lipid medications/supplements?:  none      Amount of exercise or physical activity: None    Problems taking medications regularly: No    Medication side effects: none    Diet: regular (no restrictions)        Hypertension Follow-up      Outpatient blood pressures are being checked at home.  Results are in the acceptable range.    Low Salt Diet: no added salt      Problem list and histories reviewed & adjusted, as indicated.  Additional history: he needs a refill on his acyclovir        Reviewed and updated as needed this visit by clinical staff  Allergies       Reviewed and updated as needed this visit by Provider               ROS:  Constitutional, HEENT, cardiovascular, pulmonary, gi and gu systems are negative, except as otherwise noted.        OBJECTIVE:                                                   "  /77  Pulse 76  Temp 98.6  F (37  C) (Tympanic)  Resp 16  Ht 6' 3\" (1.905 m)  Wt 275 lb 9.6 oz (125 kg)  BMI 34.45 kg/m2    GENERAL: healthy, alert and no distress  EYES: Eyes grossly normal to inspection, extraocular movements - intact, and PERRL  NECK: no tenderness, no adenopathy, no asymmetry, no masses, no stiffness; thyroid- normal to palpation  RESP: lungs clear to auscultation - no rales, no rhonchi, no wheezes  CV: regular rates and rhythm, normal S1 S2, no S3 or S4 and no murmur, no click or rub -  MS: extremities- no gross deformities noted, no edema  Diabetic foot exam: no trophic changes or ulcerative lesions and normal monofilament exam         ASSESSMENT/PLAN:                                                    ASSESSMENT:  1. Type 2 diabetes mellitus with hyperglycemia, without long-term current use of insulin (H)    2. Hyperlipidemia LDL goal <100    3. Essential hypertension with goal blood pressure less than 140/90    4. Herpes simplex virus infection        PLAN:  Orders Placed This Encounter     Hemoglobin A1c     blood glucose monitoring (ONETOUCH ULTRA) test strip     simvastatin (ZOCOR) 40 MG tablet     dulaglutide (TRULICITY) 0.75 MG/0.5ML pen     metFORMIN (GLUCOPHAGE-XR) 500 MG 24 hr tablet     lisinopril (PRINIVIL/ZESTRIL) 40 MG tablet     acyclovir (ZOVIRAX) 800 MG tablet     After some discussion we opted to discontinue the glimepiride and put him on dulaglutide. Discussed how to take the medication(s), expected outcomes, potential side effects.  If he finds out that it is too costly we could consider another alternative.  Come back for another glycosylated hemoglobin in 3 months and if that is down to goal, next office visit in 6 months    MICHELLE Barragan  [facility]     "

## 2018-08-06 ENCOUNTER — TELEPHONE (OUTPATIENT)
Dept: FAMILY MEDICINE | Facility: CLINIC | Age: 58
End: 2018-08-06

## 2018-08-06 NOTE — TELEPHONE ENCOUNTER
Medical management: Diabetes        Please call patient and remind him that I wanted him to come in at this point for repeat glycosylated hemoglobin to see if the new medication has helped improve his blood sugar control.  I see that he saw 1 of the doctors from the [facility] since my last visit, so if he has switched clinics, let us know.  Please send any recent glycosylated hemoglobin so we may document this in our records

## 2018-11-07 DIAGNOSIS — E11.65 TYPE 2 DIABETES MELLITUS WITH HYPERGLYCEMIA, WITHOUT LONG-TERM CURRENT USE OF INSULIN (H): ICD-10-CM

## 2018-11-07 RX ORDER — METFORMIN HCL 500 MG
TABLET, EXTENDED RELEASE 24 HR ORAL
Qty: 360 TABLET | Refills: 1 | OUTPATIENT
Start: 2018-11-07

## 2018-11-07 NOTE — TELEPHONE ENCOUNTER
"Requested Prescriptions   Pending Prescriptions Disp Refills     metFORMIN (GLUCOPHAGE-XR) 500 MG 24 hr tablet [Pharmacy Med Name: METFORMIN HCL ER TABS 500MG]  Last Written Prescription Date:  05/11/18  Last Fill Quantity: 360,  # refills: 1   Last office visit: 5/11/2018 with prescribing provider:  05/11/18   Future Office Visit:     360 tablet 1     Sig: TAKE 2 TABLETS TWICE A DAY    Biguanide Agents Failed    11/7/2018 12:20 AM       Failed - Patient has had a Microalbumin in the past 15 mos.    Recent Labs   Lab Test  11/27/15   0901   MICROL  24   UMALCR  19.92*          Failed - Patient has documented A1c within the specified period of time.    If HgbA1C is 8 or greater, it needs to be on file within the past 3 months.  If less than 8, must be on file within the past 6 months.     Recent Labs   Lab Test  05/09/18   0758   A1C  8.4*          Passed - Blood pressure less than 140/90 in past 6 months    BP Readings from Last 3 Encounters:   05/11/18 122/77   11/28/17 130/80   06/26/17 138/86          Passed - Patient has documented LDL within the past 12 mos.    Recent Labs   Lab Test  05/09/18   0758   LDL  54          Passed - Patient is age 10 or older       Passed - Patient's CR is NOT>1.4 OR Patient's EGFR is NOT<45 within past 12 mos.    Recent Labs   Lab Test  05/09/18   0758   GFRESTIMATED  >90   GFRESTBLACK  >90       Recent Labs   Lab Test  05/09/18   0758   CR  0.83          Passed - Patient does NOT have a diagnosis of CHF.       Passed - Recent (6 mo) or future (30 days) visit within the authorizing provider's specialty    Patient had office visit in the last 6 months or has a visit in the next 30 days with authorizing provider or within the authorizing provider's specialty.  See \"Patient Info\" tab in inbasket, or \"Choose Columns\" in Meds & Orders section of the refill encounter.              "

## 2018-11-07 NOTE — TELEPHONE ENCOUNTER
Patient established care at [facility], there was a script sent in August noted on Reconc[facility] records.     Kendra LOGAN RN

## 2018-11-20 DIAGNOSIS — I10 ESSENTIAL HYPERTENSION WITH GOAL BLOOD PRESSURE LESS THAN 140/90: ICD-10-CM

## 2018-11-20 DIAGNOSIS — E78.5 HYPERLIPIDEMIA LDL GOAL <100: ICD-10-CM

## 2018-11-20 NOTE — TELEPHONE ENCOUNTER
"Both Meds:  Last Written Prescription Date:  5/11/2018  Last Fill Quantity: 90,  # refills: 1   Last office visit: 5/11/2018 with prescribing provider:  Post   Future Office Visit:      Requested Prescriptions   Pending Prescriptions Disp Refills     simvastatin (ZOCOR) 40 MG tablet [Pharmacy Med Name: SIMVASTATIN TABS 40MG] 90 tablet 1     Sig: TAKE 1 TABLET AT BEDTIME    Statins Protocol Passed    11/20/2018 12:52 AM       Passed - LDL on file in past 12 months    Recent Labs   Lab Test  05/09/18   0758   LDL  54            Passed - No abnormal creatine kinase in past 12 months    No lab results found.            Passed - Recent (12 mo) or future (30 days) visit within the authorizing provider's specialty    Patient had office visit in the last 12 months or has a visit in the next 30 days with authorizing provider or within the authorizing provider's specialty.  See \"Patient Info\" tab in inbasket, or \"Choose Columns\" in Meds & Orders section of the refill encounter.             Passed - Patient is age 18 or older        lisinopril (PRINIVIL/ZESTRIL) 40 MG tablet [Pharmacy Med Name: LISINOPRIL TABS 40MG] 90 tablet 1     Sig: TAKE 1 TABLET DAILY    ACE Inhibitors (Including Combos) Protocol Passed    11/20/2018 12:52 AM       Passed - Blood pressure under 140/90 in past 12 months    BP Readings from Last 3 Encounters:   05/11/18 122/77   11/28/17 130/80   06/26/17 138/86                Passed - Recent (12 mo) or future (30 days) visit within the authorizing provider's specialty    Patient had office visit in the last 12 months or has a visit in the next 30 days with authorizing provider or within the authorizing provider's specialty.  See \"Patient Info\" tab in inbasket, or \"Choose Columns\" in Meds & Orders section of the refill encounter.             Passed - Patient is age 18 or older       Passed - Normal serum creatinine on file in past 12 months    Recent Labs   Lab Test  05/09/18   0758   CR  0.83            " Passed - Normal serum potassium on file in past 12 months    Recent Labs   Lab Test  05/09/18   0758   POTASSIUM  4.5

## 2018-11-21 RX ORDER — LISINOPRIL 40 MG/1
TABLET ORAL
Qty: 90 TABLET | Refills: 2 | Status: SHIPPED | OUTPATIENT
Start: 2018-11-21

## 2018-11-21 RX ORDER — SIMVASTATIN 40 MG
TABLET ORAL
Qty: 90 TABLET | Refills: 2 | Status: SHIPPED | OUTPATIENT
Start: 2018-11-21

## 2018-11-21 NOTE — TELEPHONE ENCOUNTER
Prescription approved per [facility] [facility].  Adrianne GARRISON RN

## 2019-07-10 DIAGNOSIS — B00.9 HERPES SIMPLEX VIRUS INFECTION: ICD-10-CM

## 2019-07-10 NOTE — TELEPHONE ENCOUNTER
"Requested Prescriptions   Pending Prescriptions Disp Refills     acyclovir (ZOVIRAX) 800 MG tablet 35 tablet 11     Sig: TAKE ONE TABLET BY MOUTH FIVE TIMES A DAY   Last Written Prescription Date:  5/11/18  Last Fill Quantity: 35 tab,  # refills: 11   Last office visit: 3/12/19 with prescribing provider:  Sai Singh   Future Office Visit:        Antivirals for Herpes Protocol Failed - 7/10/2019  8:54 AM        Failed - Recent (12 mo) or future (30 days) visit within the authorizing provider's specialty     Patient had office visit in the last 12 months or has a visit in the next 30 days with authorizing provider or within the authorizing provider's specialty.  See \"Patient Info\" tab in inbasket, or \"Choose Columns\" in Meds & Orders section of the refill encounter.              Failed - Normal serum creatinine on file in past 12 months     Recent Labs   Lab Test 05/09/18  0758   CR 0.83             Passed - Patient is age 12 or older        Passed - Medication is active on med list          "

## 2019-07-11 RX ORDER — ACYCLOVIR 800 MG/1
TABLET ORAL
Qty: 35 TABLET | Refills: 11 | OUTPATIENT
Start: 2019-07-11

## 2019-08-19 DIAGNOSIS — I10 ESSENTIAL HYPERTENSION WITH GOAL BLOOD PRESSURE LESS THAN 140/90: ICD-10-CM

## 2019-08-19 NOTE — TELEPHONE ENCOUNTER
"Requested Prescriptions   Pending Prescriptions Disp Refills     lisinopril (PRINIVIL/ZESTRIL) 40 MG tablet 90 tablet 2     Sig: Take 1 tablet (40 mg) by mouth daily   Last Written Prescription Date:  11/21/18  Last Fill Quantity: 90 tab,  # refills: 2   Last office visit: 5/11/2018 with prescribing provider:  Post R   Future Office Visit:        ACE Inhibitors (Including Combos) Protocol Failed - 8/19/2019  4:19 PM        Failed - Blood pressure under 140/90 in past 12 months     BP Readings from Last 3 Encounters:   05/11/18 122/77   11/28/17 130/80   06/26/17 138/86                 Failed - Recent (12 mo) or future (30 days) visit within the authorizing provider's specialty     Patient had office visit in the last 12 months or has a visit in the next 30 days with authorizing provider or within the authorizing provider's specialty.  See \"Patient Info\" tab in inbasket, or \"Choose Columns\" in Meds & Orders section of the refill encounter.              Failed - Normal serum creatinine on file in past 12 months     Recent Labs   Lab Test 05/09/18  0758   CR 0.83             Failed - Normal serum potassium on file in past 12 months     Recent Labs   Lab Test 05/09/18  0758   POTASSIUM 4.5             Passed - Medication is active on med list        Passed - Patient is age 18 or older          "

## 2019-08-20 RX ORDER — LISINOPRIL 40 MG/1
40 TABLET ORAL DAILY
Qty: 90 TABLET | Refills: 2 | OUTPATIENT
Start: 2019-08-20

## 2019-08-20 NOTE — TELEPHONE ENCOUNTER
Former Dr. Barragan patient.  Now sees Sai Singh MD  at [facility] in [place].    Hortensia VELEZ RN

## 2019-11-03 ENCOUNTER — HEALTH MAINTENANCE LETTER (OUTPATIENT)
Age: 59
End: 2019-11-03

## 2020-01-14 ENCOUNTER — HOSPITAL ENCOUNTER (OUTPATIENT)
Dept: PHYSICAL THERAPY | Facility: CLINIC | Age: 60
Setting detail: THERAPIES SERIES
End: 2020-01-14
Attending: ORTHOPAEDIC SURGERY
Payer: COMMERCIAL

## 2020-01-14 PROCEDURE — 97161 PT EVAL LOW COMPLEX 20 MIN: CPT | Mod: GP | Performed by: PHYSICAL THERAPIST

## 2020-01-14 PROCEDURE — 97110 THERAPEUTIC EXERCISES: CPT | Mod: GP | Performed by: PHYSICAL THERAPIST

## 2020-01-14 PROCEDURE — 97140 MANUAL THERAPY 1/> REGIONS: CPT | Mod: GP | Performed by: PHYSICAL THERAPIST

## 2020-01-14 NOTE — PROGRESS NOTES
01/14/20 0900   General Information   Type of Visit Initial OP Ortho PT Evaluation   Start of Care Date 01/14/20   Referring Physician Damian Snider MD   Patient/Family Goals Statement get back doing stairs, driving,   Orders Evaluate and Treat   Date of Order 01/13/20   Certification Required? 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  This resulted in early DDD and TKA.  Before surger pt reports he had full ROM with excessive hyperextension due to no PCL.  Surgery went well per pt and wife, a lot of fluid was drained from knee and swelling has been an issue.     Impairments A. Pain;D. Decreased ROM;C. Swelling;B. Decreased WB tolerance;E. Decreased flexibility;F. Decreased strength and endurance;G. Impaired balance;H. Impaired gait;K. Numbness;P. Bowel or bladder problems   Functional Limitations perform activities of daily living;perform required work activities;perform desired leisure / sports activities   Symptom Location left knee - medial and lateral sides   How/Where did it occur From Degenerative Joint Disease   Onset date of current episode/exacerbation 01/09/20   Chronicity New   Pain rating (0-10 point scale) Best (/10);Worst (/10)   Best (/10) 0   Worst (/10) 6   Pain quality A. Sharp;B. Dull;C. Aching;E. Shooting   Frequency of pain/symptoms C. With activity   Pain/symptoms are: The same all the time   Pain/symptoms exacerbated by C. Lifting;I. Bending   Pain/symptoms eased by A. Sitting;B. Walking;C.  Rest;H. Cold;I. OTC medication(s)   Progression of symptoms since onset: Improved   Prior Level of Function   Prior Level of Function-Mobility Independent   Prior Level of Function-ADLs Independent   Current Level of Function   Current Community Support Family/friend caregiver   Patient role/employment history A. 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 No   Have you fallen and had an injury in the past year? No   Is patient a fall risk? No   Knee Objective Findings   Side (if bilateral, select both right and left) Left   Integumentary  joint line right=44; left=48 cm   Gait/Locomotion front wheeled walker; lands on flexed left knee; reduced stance time left   Left Knee Extension AROM right=0; left=-10   Left Knee Flexion AROM right=120 deg   Left Knee Flexion PROM left=70 deg   Left Knee Flexion Strength 4/5   Left Knee Extension Strength 3+/5   Left Hip Abduction Strength 4/5   Left Quad Set Strength poor   Planned Therapy Interventions   Planned Therapy Interventions ADL retraining;balance training;gait training;joint mobilization;manual therapy;motor coordination training;neuromuscular re-education;ROM;strengthening;stretching   Planned Modality Interventions   Planned Modality Interventions Cryotherapy   Clinical Impression   Criteria for Skilled Therapeutic Interventions Met yes, treatment indicated   PT Diagnosis s/p left TKA 1/9/20   Influenced by the following impairments Pain; weakness; edema; impaired ROM; impaired gait and proprioception   Functional limitations due to impairments Difficulty with ambulation, ADL's, unable to drive / walk community distances   Clinical Presentation Stable/Uncomplicated   Clinical Presentation Rationale (+) motivation;  age; health; wife's help at home  (-) knee status pre surgery   Clinical Decision Making (Complexity) Low complexity   Therapy Frequency 2 times/Week   Predicted Duration of Therapy Intervention (days/wks) 2x/wk for 4 weeks; 1x/wk for 4 weeks   Risk & Benefits of therapy have been explained Yes   Patient, Family & other staff in agreement with plan of care Yes   Clinical Impression Comments Mat arrives today 5 days s/p left TKA; he will benefit from skilled PT to address the above impairments and functional limitations.   Education Assessment   Preferred Learning Style Listening;Demonstration;Pictures/video   Barriers to Learning No barriers   ORTHO GOALS   PT Ortho Eval Goals 1;2;3   Ortho Goal 1   Goal Description Patient will have >=0-125 deg of left knee ROM to allow for normalized ADL's.   Target Date 03/10/20   Ortho Goal 2   Goal Description Patient will be able to walk household distances without an assistive device.   Target Date 02/25/20   Ortho Goal 3   Goal Description Patient will be able to climb 2 stairs without railings to allow for normalized household mobility.   Target Date 03/10/20   Total Evaluation Time   PT Eval, Low Complexity Minutes (29680) 20       Gloria Red, PT, DTP, SCS  Doctor of Physical Therapy #[id number]  [facility]  [phone]  [email or web address]

## 2020-01-17 ENCOUNTER — HOSPITAL ENCOUNTER (OUTPATIENT)
Dept: PHYSICAL THERAPY | Facility: CLINIC | Age: 60
Setting detail: THERAPIES SERIES
End: 2020-01-17
Attending: ORTHOPAEDIC SURGERY
Payer: COMMERCIAL

## 2020-01-17 PROCEDURE — 97110 THERAPEUTIC EXERCISES: CPT | Mod: GP | Performed by: PHYSICAL THERAPIST

## 2020-01-20 ENCOUNTER — HOSPITAL ENCOUNTER (OUTPATIENT)
Dept: PHYSICAL THERAPY | Facility: CLINIC | Age: 60
Setting detail: THERAPIES SERIES
End: 2020-01-20
Attending: ORTHOPAEDIC SURGERY
Payer: COMMERCIAL

## 2020-01-20 PROCEDURE — 97110 THERAPEUTIC EXERCISES: CPT | Mod: GP | Performed by: PHYSICAL THERAPIST

## 2020-01-24 ENCOUNTER — HOSPITAL ENCOUNTER (OUTPATIENT)
Dept: PHYSICAL THERAPY | Facility: CLINIC | Age: 60
Setting detail: THERAPIES SERIES
End: 2020-01-24
Attending: ORTHOPAEDIC SURGERY
Payer: COMMERCIAL

## 2020-01-24 PROCEDURE — 97110 THERAPEUTIC EXERCISES: CPT | Mod: GP | Performed by: PHYSICAL THERAPIST

## 2020-01-28 ENCOUNTER — HOSPITAL ENCOUNTER (OUTPATIENT)
Dept: PHYSICAL THERAPY | Facility: CLINIC | Age: 60
Setting detail: THERAPIES SERIES
End: 2020-01-28
Attending: ORTHOPAEDIC SURGERY
Payer: COMMERCIAL

## 2020-01-28 PROCEDURE — 97110 THERAPEUTIC EXERCISES: CPT | Mod: GP | Performed by: PHYSICAL THERAPIST

## 2020-01-31 ENCOUNTER — HOSPITAL ENCOUNTER (OUTPATIENT)
Dept: PHYSICAL THERAPY | Facility: CLINIC | Age: 60
Setting detail: THERAPIES SERIES
End: 2020-01-31
Attending: ORTHOPAEDIC SURGERY
Payer: COMMERCIAL

## 2020-01-31 PROCEDURE — 97110 THERAPEUTIC EXERCISES: CPT | Mod: GP | Performed by: PHYSICAL THERAPIST

## 2020-01-31 PROCEDURE — 97140 MANUAL THERAPY 1/> REGIONS: CPT | Mod: GP | Performed by: PHYSICAL THERAPIST

## 2020-02-04 ENCOUNTER — HOSPITAL ENCOUNTER (OUTPATIENT)
Dept: PHYSICAL THERAPY | Facility: CLINIC | Age: 60
Setting detail: THERAPIES SERIES
End: 2020-02-04
Attending: ORTHOPAEDIC SURGERY
Payer: COMMERCIAL

## 2020-02-04 PROCEDURE — 97140 MANUAL THERAPY 1/> REGIONS: CPT | Mod: GP | Performed by: PHYSICAL THERAPIST

## 2020-02-04 PROCEDURE — 97110 THERAPEUTIC EXERCISES: CPT | Mod: GP | Performed by: PHYSICAL THERAPIST

## 2020-02-07 ENCOUNTER — HOSPITAL ENCOUNTER (OUTPATIENT)
Dept: PHYSICAL THERAPY | Facility: CLINIC | Age: 60
Setting detail: THERAPIES SERIES
End: 2020-02-07
Attending: ORTHOPAEDIC SURGERY
Payer: COMMERCIAL

## 2020-02-07 PROCEDURE — 97110 THERAPEUTIC EXERCISES: CPT | Mod: GP | Performed by: PHYSICAL THERAPIST

## 2020-02-07 PROCEDURE — 97140 MANUAL THERAPY 1/> REGIONS: CPT | Mod: GP | Performed by: PHYSICAL THERAPIST

## 2020-02-11 ENCOUNTER — HOSPITAL ENCOUNTER (OUTPATIENT)
Dept: PHYSICAL THERAPY | Facility: CLINIC | Age: 60
Setting detail: THERAPIES SERIES
End: 2020-02-11
Attending: ORTHOPAEDIC SURGERY
Payer: COMMERCIAL

## 2020-02-11 PROCEDURE — 97110 THERAPEUTIC EXERCISES: CPT | Mod: GP | Performed by: PHYSICAL THERAPIST

## 2020-02-11 PROCEDURE — 97140 MANUAL THERAPY 1/> REGIONS: CPT | Mod: GP | Performed by: PHYSICAL THERAPIST

## 2020-02-14 ENCOUNTER — HOSPITAL ENCOUNTER (OUTPATIENT)
Dept: PHYSICAL THERAPY | Facility: CLINIC | Age: 60
Setting detail: THERAPIES SERIES
End: 2020-02-14
Attending: ORTHOPAEDIC SURGERY
Payer: COMMERCIAL

## 2020-02-14 PROCEDURE — 97110 THERAPEUTIC EXERCISES: CPT | Mod: GP | Performed by: PHYSICAL THERAPIST

## 2020-02-14 PROCEDURE — 97140 MANUAL THERAPY 1/> REGIONS: CPT | Mod: GP | Performed by: PHYSICAL THERAPIST

## 2020-02-17 ENCOUNTER — HOSPITAL ENCOUNTER (OUTPATIENT)
Dept: PHYSICAL THERAPY | Facility: CLINIC | Age: 60
Setting detail: THERAPIES SERIES
End: 2020-02-17
Attending: ORTHOPAEDIC SURGERY
Payer: COMMERCIAL

## 2020-02-17 PROCEDURE — 97140 MANUAL THERAPY 1/> REGIONS: CPT | Mod: GP | Performed by: PHYSICAL THERAPIST

## 2020-02-17 PROCEDURE — 97110 THERAPEUTIC EXERCISES: CPT | Mod: GP | Performed by: PHYSICAL THERAPIST

## 2020-02-24 ENCOUNTER — HOSPITAL ENCOUNTER (OUTPATIENT)
Dept: PHYSICAL THERAPY | Facility: CLINIC | Age: 60
Setting detail: THERAPIES SERIES
End: 2020-02-24
Attending: ORTHOPAEDIC SURGERY
Payer: COMMERCIAL

## 2020-02-24 PROCEDURE — 97140 MANUAL THERAPY 1/> REGIONS: CPT | Mod: GP | Performed by: PHYSICAL THERAPIST

## 2020-02-24 PROCEDURE — 97110 THERAPEUTIC EXERCISES: CPT | Mod: GP | Performed by: PHYSICAL THERAPIST

## 2020-02-28 ENCOUNTER — HOSPITAL ENCOUNTER (OUTPATIENT)
Dept: PHYSICAL THERAPY | Facility: CLINIC | Age: 60
Setting detail: THERAPIES SERIES
End: 2020-02-28
Attending: ORTHOPAEDIC SURGERY
Payer: COMMERCIAL

## 2020-02-28 PROCEDURE — 97140 MANUAL THERAPY 1/> REGIONS: CPT | Mod: GP | Performed by: PHYSICAL THERAPIST

## 2020-02-28 PROCEDURE — 97110 THERAPEUTIC EXERCISES: CPT | Mod: GP | Performed by: PHYSICAL THERAPIST

## 2020-03-02 ENCOUNTER — HOSPITAL ENCOUNTER (OUTPATIENT)
Dept: PHYSICAL THERAPY | Facility: CLINIC | Age: 60
Setting detail: THERAPIES SERIES
End: 2020-03-02
Attending: ORTHOPAEDIC SURGERY
Payer: COMMERCIAL

## 2020-03-02 PROCEDURE — 97110 THERAPEUTIC EXERCISES: CPT | Mod: GP | Performed by: PHYSICAL THERAPIST

## 2020-03-02 PROCEDURE — 97140 MANUAL THERAPY 1/> REGIONS: CPT | Mod: GP | Performed by: PHYSICAL THERAPIST

## 2020-03-06 ENCOUNTER — HOSPITAL ENCOUNTER (OUTPATIENT)
Dept: PHYSICAL THERAPY | Facility: CLINIC | Age: 60
Setting detail: THERAPIES SERIES
End: 2020-03-06
Attending: ORTHOPAEDIC SURGERY
Payer: COMMERCIAL

## 2020-03-06 PROCEDURE — 97110 THERAPEUTIC EXERCISES: CPT | Mod: GP | Performed by: PHYSICAL THERAPIST

## 2020-03-06 PROCEDURE — 97140 MANUAL THERAPY 1/> REGIONS: CPT | Mod: GP | Performed by: PHYSICAL THERAPIST

## 2020-03-10 ENCOUNTER — HOSPITAL ENCOUNTER (OUTPATIENT)
Dept: PHYSICAL THERAPY | Facility: CLINIC | Age: 60
Setting detail: THERAPIES SERIES
End: 2020-03-10
Attending: ORTHOPAEDIC SURGERY
Payer: COMMERCIAL

## 2020-03-10 PROCEDURE — 97110 THERAPEUTIC EXERCISES: CPT | Mod: GP | Performed by: PHYSICAL THERAPIST

## 2020-03-10 PROCEDURE — 97140 MANUAL THERAPY 1/> REGIONS: CPT | Mod: GP | Performed by: PHYSICAL THERAPIST

## 2020-03-12 ENCOUNTER — HOSPITAL ENCOUNTER (OUTPATIENT)
Dept: PHYSICAL THERAPY | Facility: CLINIC | Age: 60
Setting detail: THERAPIES SERIES
End: 2020-03-12
Attending: ORTHOPAEDIC SURGERY
Payer: COMMERCIAL

## 2020-03-12 PROCEDURE — 97140 MANUAL THERAPY 1/> REGIONS: CPT | Mod: GP | Performed by: PHYSICAL THERAPIST

## 2020-03-12 PROCEDURE — 97110 THERAPEUTIC EXERCISES: CPT | Mod: GP | Performed by: PHYSICAL THERAPIST

## 2020-03-16 ENCOUNTER — HOSPITAL ENCOUNTER (OUTPATIENT)
Dept: PHYSICAL THERAPY | Facility: CLINIC | Age: 60
Setting detail: THERAPIES SERIES
End: 2020-03-16
Attending: ORTHOPAEDIC SURGERY
Payer: COMMERCIAL

## 2020-03-16 PROCEDURE — 97110 THERAPEUTIC EXERCISES: CPT | Mod: GP | Performed by: PHYSICAL THERAPIST

## 2020-03-16 PROCEDURE — 97140 MANUAL THERAPY 1/> REGIONS: CPT | Mod: GP | Performed by: PHYSICAL THERAPIST

## 2020-04-24 ENCOUNTER — HOSPITAL ENCOUNTER (OUTPATIENT)
Dept: PHYSICAL THERAPY | Facility: CLINIC | Age: 60
Setting detail: THERAPIES SERIES
End: 2020-04-24
Attending: ORTHOPAEDIC SURGERY
Payer: COMMERCIAL

## 2020-04-24 PROCEDURE — 97110 THERAPEUTIC EXERCISES: CPT | Mod: GP | Performed by: PHYSICAL THERAPIST

## 2020-04-24 NOTE — PROGRESS NOTES
PHYSICAL THERAPY PROGRESS NOTE  04/24/20 1100   Signing Clinician's Name / Credentials   Signing clinician's name / credentials Annette Gan, PT, DPT, OCS   Session Number   Session Number 19/30   Authorization status [facility] > 30 visits requires auth   Progress Note/Recertification   Progress Note Due Date 06/19/20   Adult Goals   PT Ortho Eval Goals 1;2;3;4;5   Ortho Goal 1   Goal Identifier 1   Goal Description Patient will have >=0-125 deg of left knee ROM to allow for normalized ADL's.   Target Date 06/19/20   Date Met   (116)   Ortho Goal 2   Goal Identifier 2   Goal Description Patient will be able to walk household distances without an assistive device.   Target Date 02/25/20   Date Met 04/24/20   Ortho Goal 3   Goal Identifier 3   Goal Description Patient will be able to climb 2 stairs without railings to allow for normalized household mobility.   Target Date 03/10/20   Date Met 04/24/20   Ortho Goal 4   Goal Identifier 4   Goal Description Patient will be able to descend stairs without rail without difficulty or pain.    Target Date 06/19/20   Ortho Goal 5   Goal Identifier 5   Goal Description Patient will be able to get up from chair without hands without pain or difficulty.    Target Date 06/19/20   Subjective Report   Subjective Report Felt like he has gone backwards in ROM as he has increased his activity. Having difficulty going down steps and getting up from chair.    Objective Measures   Objective Measures Objective Measure 1;Objective Measure 2;Objective Measure 3   Objective Measure 1   Objective Measure Left knee ROM   Details 0 - 0 - 116 deg   Treatment Interventions   Interventions Therapeutic Procedure/Exercise;Manual Therapy;Gait Training   Therapeutic Procedure/exercise   Therapeutic Procedures: strength, endurance, ROM, flexibillity minutes (82828) 30   Skilled Intervention Education; knee ROM; home exercises; quad and glute strength   Patient Response updated HEP to align  "with pt current goals. ROM relatively unchanged since last visit.    Treatment Detail Recumbent bike seat 10 x 5 minutes full revolutions. stair lunge for KF on step with cues to lean in as far as he can and holds 20'x 10. instr pt in 8\" step down with handrail x 15 cues to keep trunk upright and to avoid leaning away from surgical leg as well as to ensure knee is moving directly over the toes. instr pt in sit-to-stands no hands in chair with left leg flexed slightly further than right leg to incresae LLE effort x 10 - difficult but able to perform. supine wall slides heel slides x 10. supine knee extension stretch.    Assessments Completed   Assessments Completed Full extension has been maintained since last visit. Flexion was measured 2 degrees less than last visit but well within error of assessment and likely unchanged since last visit as well. Since ROM is stable and functional, we will transition to video visits for now to continue to progress his strength to achieve his stated goals (getting up from chairs and going down stairs).    Education   Learner Patient;Significant Other   Readiness Eager   Method Booklet/handout;Explanation;Demonstration   Response Verbalizes Understanding;Demonstrates Understanding   Plan   Home program HEP updated as noted above to add step down and STS no hands   Plan for next session since ROM has relatively unchanged since last visit and is function, will transition to video visits to continue progress ROM and strength to reach pt goals.    Comments   Comments f/u in 2 weeks for video visit   Total Session Time   Timed Code Treatment Minutes 30   Total Treatment Time (sum of timed and untimed services) 30   AMBULATORY CLINICS ONLY-MEDICAL AND TREATMENT DIAGNOSIS   PT Diagnosis s/p left TKA 1/9/20       Please contact me with any questions or concerns.  Thank you for your referral.    Annette Gan, PT, DPT, OCS  Physical Therapist, Orthopedic Certified Specialist    [facility] " [facility]  [address] [address]  [email or web address]   Office: [phone]   Employed by [employer] [employer]

## 2020-11-16 ENCOUNTER — HEALTH MAINTENANCE LETTER (OUTPATIENT)
Age: 60
End: 2020-11-16

## 2021-05-21 NOTE — PROGRESS NOTES
PHYSICAL THERAPY DISCHARGE NOTE  Patient did not return for follow up treatments.  Goal status and current objective information is therefore unknown.  The daily note from the patient's last visit will serve as the discharge note. Discharge from PT services at this time for this episode of treatment. Please see attached documentation under this episode of care for further information including dates of service, start of care date, referring physician, Dx, treatment plan, treatments, etc.    Please contact me with any questions or concerns.  Thank you for your referral.    Annette Gan PT, DPT, OCS  Physical Therapist, Orthopedic Certified Specialist    [facility]  [address]   [address]  [place] [address]  [email or web address]   Office: [phone]   Employed by [employer]     PHYSICAL THERAPY DISCHARGE NOTE  04/24/20 1100   Signing Clinician's Name / Credentials   Signing clinician's name / credentials Annette Gan PT, DPT, OCS   Session Number   Session Number 19/30   Authorization status [facility] > 30 visits requires auth   Progress Note/Recertification   Progress Note Due Date 06/19/20   Adult Goals   PT Ortho Eval Goals 1;2;3;4;5   Ortho Goal 1   Goal Identifier 1   Goal Description Patient will have >=0-125 deg of left knee ROM to allow for normalized ADL's.   Target Date 06/19/20   Date Met   (116)   Ortho Goal 2   Goal Identifier 2   Goal Description Patient will be able to walk household distances without an assistive device.   Target Date 02/25/20   Date Met 04/24/20   Ortho Goal 3   Goal Identifier 3   Goal Description Patient will be able to climb 2 stairs without railings to allow for normalized household mobility.   Target Date 03/10/20   Date Met 04/24/20   Ortho Goal 4   Goal Identifier 4   Goal Description Patient will be able to descend stairs without rail without difficulty or pain.    Target  "Date 06/19/20   Ortho Goal 5   Goal Identifier 5   Goal Description Patient will be able to get up from chair without hands without pain or difficulty.    Target Date 06/19/20   Subjective Report   Subjective Report Felt like he has gone backwards in ROM as he has increased his activity. Having difficulty going down steps and getting up from chair.    Objective Measures   Objective Measures Objective Measure 1;Objective Measure 2;Objective Measure 3   Objective Measure 1   Objective Measure Left knee ROM   Details 0 - 0 - 116 deg   Treatment Interventions   Interventions Therapeutic Procedure/Exercise;Manual Therapy;Gait Training   Therapeutic Procedure/exercise   Therapeutic Procedures: strength, endurance, ROM, flexibillity minutes (06053) 30   Skilled Intervention Education; knee ROM; home exercises; quad and glute strength   Patient Response updated HEP to align with pt current goals. ROM relatively unchanged since last visit.    Treatment Detail Recumbent bike seat 10 x 5 minutes full revolutions. stair lunge for KF on step with cues to lean in as far as he can and holds 20'x 10. instr pt in 8\" step down with handrail x 15 cues to keep trunk upright and to avoid leaning away from surgical leg as well as to ensure knee is moving directly over the toes. instr pt in sit-to-stands no hands in chair with left leg flexed slightly further than right leg to incresae LLE effort x 10 - difficult but able to perform. supine wall slides heel slides x 10. supine knee extension stretch.    Assessments Completed   Assessments Completed Full extension has been maintained since last visit. Flexion was measured 2 degrees less than last visit but well within error of assessment and likely unchanged since last visit as well. Since ROM is stable and functional, we will transition to video visits for now to continue to progress his strength to achieve his stated goals (getting up from chairs and going down stairs).    Education "   Learner Patient;Significant Other   Readiness Eager   Method Booklet/handout;Explanation;Demonstration   Response Verbalizes Understanding;Demonstrates Understanding   Plan   Home program HEP updated as noted above to add step down and STS no hands   Plan for next session since ROM has relatively unchanged since last visit and is function, will transition to video visits to continue progress ROM and strength to reach pt goals.    Comments   Comments f/u in 2 weeks for video visit   Total Session Time   Timed Code Treatment Minutes 30   Total Treatment Time (sum of timed and untimed services) 30   AMBULATORY CLINICS ONLY-MEDICAL AND TREATMENT DIAGNOSIS   PT Diagnosis s/p left TKA 1/9/20

## 2021-05-29 ENCOUNTER — HEALTH MAINTENANCE LETTER (OUTPATIENT)
Age: 61
End: 2021-05-29

## 2021-09-18 ENCOUNTER — HEALTH MAINTENANCE LETTER (OUTPATIENT)
Age: 61
End: 2021-09-18

## 2022-01-08 ENCOUNTER — HEALTH MAINTENANCE LETTER (OUTPATIENT)
Age: 62
End: 2022-01-08

## 2022-08-14 ENCOUNTER — HEALTH MAINTENANCE LETTER (OUTPATIENT)
Age: 62
End: 2022-08-14

## 2022-11-19 ENCOUNTER — HEALTH MAINTENANCE LETTER (OUTPATIENT)
Age: 62
End: 2022-11-19

## 2023-04-09 ENCOUNTER — HEALTH MAINTENANCE LETTER (OUTPATIENT)
Age: 63
End: 2023-04-09

## 2023-11-19 ENCOUNTER — HEALTH MAINTENANCE LETTER (OUTPATIENT)
Age: 63
End: 2023-11-19